# Patient Record
Sex: FEMALE | Race: BLACK OR AFRICAN AMERICAN | NOT HISPANIC OR LATINO | Employment: PART TIME | URBAN - NONMETROPOLITAN AREA
[De-identification: names, ages, dates, MRNs, and addresses within clinical notes are randomized per-mention and may not be internally consistent; named-entity substitution may affect disease eponyms.]

---

## 2018-09-17 ENCOUNTER — OFFICE VISIT (OUTPATIENT)
Dept: SURGERY | Facility: CLINIC | Age: 43
End: 2018-09-17

## 2018-09-17 VITALS
DIASTOLIC BLOOD PRESSURE: 84 MMHG | WEIGHT: 264 LBS | HEART RATE: 85 BPM | BODY MASS INDEX: 40.01 KG/M2 | SYSTOLIC BLOOD PRESSURE: 104 MMHG | HEIGHT: 68 IN | TEMPERATURE: 99.4 F

## 2018-09-17 DIAGNOSIS — N61.1 BREAST ABSCESS: Primary | ICD-10-CM

## 2018-09-17 PROCEDURE — 99203 OFFICE O/P NEW LOW 30 MIN: CPT | Performed by: SURGERY

## 2018-09-17 RX ORDER — SODIUM CHLORIDE 9 MG/ML
100 INJECTION, SOLUTION INTRAVENOUS CONTINUOUS
Status: CANCELLED | OUTPATIENT
Start: 2018-09-19

## 2018-09-17 RX ORDER — CEFAZOLIN SODIUM IN 0.9 % NACL 3 G/100 ML
3 INTRAVENOUS SOLUTION, PIGGYBACK (ML) INTRAVENOUS ONCE
Status: CANCELLED | OUTPATIENT
Start: 2018-09-19 | End: 2018-09-19

## 2018-09-18 ENCOUNTER — APPOINTMENT (OUTPATIENT)
Dept: PREADMISSION TESTING | Facility: HOSPITAL | Age: 43
End: 2018-09-18

## 2018-09-18 VITALS
HEIGHT: 68 IN | WEIGHT: 264 LBS | DIASTOLIC BLOOD PRESSURE: 76 MMHG | HEART RATE: 73 BPM | RESPIRATION RATE: 14 BRPM | SYSTOLIC BLOOD PRESSURE: 118 MMHG | OXYGEN SATURATION: 96 % | BODY MASS INDEX: 40.01 KG/M2

## 2018-09-18 NOTE — PROGRESS NOTES
"CHIEF COMPLAINT:    Abnormal breast imaging of the right breast, left flank abscess    HISTORY OF PRESENT ILLNESS:    Elijah Crouch is a 43 y.o. female who has had an evolving \"mass\" of the right breast for approximate 6 months.  She's previously had an abnormal ultrasound showing what appeared to be a dilated duct.  Recently she is experienced firmness of the skin surrounding her nipple with small amounts of intermittent drainage.  She states that there has been pain and erythema at this site as well.  Recent imaging of her breast showed skin thickening and biopsy was suggested.    She has family history of breast cancer in maternal aunt, she has never personally had abnormal breast imaging or other breast biopsy.    Menarche was at age 15, she has not undergone menopause, she is  she has taken oral contraceptives previously.    No past medical history on file.    Past Surgical History:   Procedure Laterality Date   • ESSURE TUBAL LIGATION     • HYSTEROSCOPY ENDOMETRIAL ABLATION         Prior to Admission medications    Not on File       No Known Allergies    Family History   Problem Relation Age of Onset   • Breast cancer Mother        Social History     Social History   • Marital status: Single     Spouse name: N/A   • Number of children: N/A   • Years of education: N/A     Occupational History   • Not on file.     Social History Main Topics   • Smoking status: Current Every Day Smoker     Packs/day: 0.50     Years: 7.00   • Smokeless tobacco: Never Used   • Alcohol use No   • Drug use: No   • Sexual activity: Defer     Other Topics Concern   • Not on file     Social History Narrative   • No narrative on file       Review of Systems   Constitutional: Negative for appetite change, chills, fever and unexpected weight change.   HENT: Negative for hearing loss, nosebleeds and trouble swallowing.    Eyes: Negative for visual disturbance.   Respiratory: Negative for apnea, cough, choking, chest " "tightness, shortness of breath, wheezing and stridor.    Cardiovascular: Negative for chest pain, palpitations and leg swelling.   Gastrointestinal: Negative for abdominal distention, abdominal pain, blood in stool, constipation, diarrhea, nausea and vomiting.   Endocrine: Negative for cold intolerance, heat intolerance, polydipsia, polyphagia and polyuria.   Genitourinary: Negative for difficulty urinating, dysuria, frequency, hematuria and urgency.   Musculoskeletal: Negative for arthralgias, back pain, myalgias and neck pain.   Skin: Negative for color change, pallor and rash.   Allergic/Immunologic: Negative for immunocompromised state.   Neurological: Negative for dizziness, seizures, syncope, light-headedness, numbness and headaches.   Hematological: Negative for adenopathy.   Psychiatric/Behavioral: Negative for suicidal ideas. The patient is not nervous/anxious.        Objective     /84   Pulse 85   Temp 99.4 °F (37.4 °C) (Oral)   Ht 172.7 cm (68\")   Wt 120 kg (264 lb)   LMP 08/28/2018   BMI 40.14 kg/m²     Physical Exam   Constitutional: She is oriented to person, place, and time. She appears well-developed and well-nourished. No distress.   HENT:   Head: Normocephalic and atraumatic.   Eyes: Pupils are equal, round, and reactive to light. Conjunctivae and EOM are normal. Right eye exhibits no discharge. Left eye exhibits no discharge.   Neck: Normal range of motion. Neck supple. No JVD present. No tracheal deviation present. No thyromegaly present.   Cardiovascular: Normal rate, regular rhythm and normal heart sounds.  Exam reveals no gallop and no friction rub.    No murmur heard.  Pulmonary/Chest: Effort normal and breath sounds normal. No respiratory distress. She has no wheezes. She has no rales. She exhibits no tenderness.       Abdominal: Soft. She exhibits no distension and no mass. There is no tenderness. There is no rebound and no guarding. No hernia.   Musculoskeletal: Normal range of " motion. She exhibits no edema, tenderness or deformity.   Neurological: She is alert and oriented to person, place, and time. No cranial nerve deficit.   Skin: Skin is warm and dry. No rash noted. She is not diaphoretic. No erythema. No pallor.   Psychiatric: She has a normal mood and affect. Her behavior is normal. Judgment and thought content normal.       DIAGNOSTIC DATA:    Mammogram and ultrasound right breast reviewed showing skin thickening adjacent to the nipple in the area of palpable concern    ASSESSMENT:    Right breast chronic abscess with overlying skin changes, left flank abscess    PLAN:    On examination she appears to have an abscess versus a chronically obstructed duct.  This area needs to be incised and potentially biopsied in the operating room.  Additionally we'll drain her left flank abscess concurrent with that.  Risks and benefits of incision and drainage right breast abscess and left flank abscess have been discussed with the patient and she is agreeable to proceeding.          This document has been electronically signed by Seamus Colón MD on September 18, 2018 4:41 PM

## 2018-09-19 ENCOUNTER — HOSPITAL ENCOUNTER (OUTPATIENT)
Facility: HOSPITAL | Age: 43
Setting detail: HOSPITAL OUTPATIENT SURGERY
Discharge: HOME | End: 2018-09-19
Attending: SURGERY | Admitting: SURGERY

## 2018-09-19 ENCOUNTER — ANESTHESIA (OUTPATIENT)
Dept: PERIOP | Facility: HOSPITAL | Age: 43
End: 2018-09-19

## 2018-09-19 ENCOUNTER — ANESTHESIA EVENT (OUTPATIENT)
Dept: PERIOP | Facility: HOSPITAL | Age: 43
End: 2018-09-19

## 2018-09-19 VITALS
WEIGHT: 262.79 LBS | HEART RATE: 58 BPM | OXYGEN SATURATION: 100 % | TEMPERATURE: 96.9 F | DIASTOLIC BLOOD PRESSURE: 75 MMHG | HEIGHT: 69 IN | RESPIRATION RATE: 18 BRPM | BODY MASS INDEX: 38.92 KG/M2 | SYSTOLIC BLOOD PRESSURE: 157 MMHG

## 2018-09-19 DIAGNOSIS — N61.1 BREAST ABSCESS: ICD-10-CM

## 2018-09-19 PROCEDURE — 25010000002 ONDANSETRON PER 1 MG: Performed by: NURSE ANESTHETIST, CERTIFIED REGISTERED

## 2018-09-19 PROCEDURE — 88305 TISSUE EXAM BY PATHOLOGIST: CPT | Performed by: PATHOLOGY

## 2018-09-19 PROCEDURE — 88312 SPECIAL STAINS GROUP 1: CPT | Performed by: SURGERY

## 2018-09-19 PROCEDURE — 25010000002 MIDAZOLAM PER 1 MG: Performed by: NURSE ANESTHETIST, CERTIFIED REGISTERED

## 2018-09-19 PROCEDURE — 88312 SPECIAL STAINS GROUP 1: CPT | Performed by: PATHOLOGY

## 2018-09-19 PROCEDURE — 25010000002 DEXAMETHASONE PER 1 MG: Performed by: NURSE ANESTHETIST, CERTIFIED REGISTERED

## 2018-09-19 PROCEDURE — 10061 I&D ABSCESS COMP/MULTIPLE: CPT | Performed by: SURGERY

## 2018-09-19 PROCEDURE — 25010000002 FENTANYL CITRATE (PF) 100 MCG/2ML SOLUTION: Performed by: NURSE ANESTHETIST, CERTIFIED REGISTERED

## 2018-09-19 PROCEDURE — 25010000003 CEFAZOLIN PER 500 MG: Performed by: NURSE ANESTHETIST, CERTIFIED REGISTERED

## 2018-09-19 PROCEDURE — 25010000002 PROPOFOL 10 MG/ML EMULSION: Performed by: NURSE ANESTHETIST, CERTIFIED REGISTERED

## 2018-09-19 PROCEDURE — 19020 MASTOTOMY EXPL DRG ABSC DP: CPT | Performed by: SURGERY

## 2018-09-19 PROCEDURE — 88305 TISSUE EXAM BY PATHOLOGIST: CPT | Performed by: SURGERY

## 2018-09-19 RX ORDER — ACETAMINOPHEN 650 MG/1
650 SUPPOSITORY RECTAL ONCE AS NEEDED
Status: COMPLETED | OUTPATIENT
Start: 2018-09-19 | End: 2018-09-19

## 2018-09-19 RX ORDER — HYDROCODONE BITARTRATE AND ACETAMINOPHEN 5; 325 MG/1; MG/1
1-2 TABLET ORAL EVERY 4 HOURS PRN
Qty: 20 TABLET | Refills: 0 | Status: SHIPPED | OUTPATIENT
Start: 2018-09-19 | End: 2018-11-02

## 2018-09-19 RX ORDER — CEFAZOLIN SODIUM IN 0.9 % NACL 3 G/100 ML
3 INTRAVENOUS SOLUTION, PIGGYBACK (ML) INTRAVENOUS ONCE
Status: COMPLETED | OUTPATIENT
Start: 2018-09-19 | End: 2018-09-19

## 2018-09-19 RX ORDER — LABETALOL HYDROCHLORIDE 5 MG/ML
5 INJECTION, SOLUTION INTRAVENOUS
Status: DISCONTINUED | OUTPATIENT
Start: 2018-09-19 | End: 2018-09-19 | Stop reason: HOSPADM

## 2018-09-19 RX ORDER — DEXAMETHASONE SODIUM PHOSPHATE 4 MG/ML
INJECTION, SOLUTION INTRA-ARTICULAR; INTRALESIONAL; INTRAMUSCULAR; INTRAVENOUS; SOFT TISSUE AS NEEDED
Status: DISCONTINUED | OUTPATIENT
Start: 2018-09-19 | End: 2018-09-19 | Stop reason: SURG

## 2018-09-19 RX ORDER — EPHEDRINE SULFATE 50 MG/ML
5 INJECTION, SOLUTION INTRAVENOUS ONCE AS NEEDED
Status: DISCONTINUED | OUTPATIENT
Start: 2018-09-19 | End: 2018-09-19 | Stop reason: HOSPADM

## 2018-09-19 RX ORDER — NALOXONE HCL 0.4 MG/ML
0.2 VIAL (ML) INJECTION AS NEEDED
Status: DISCONTINUED | OUTPATIENT
Start: 2018-09-19 | End: 2018-09-19 | Stop reason: HOSPADM

## 2018-09-19 RX ORDER — OXYCODONE HYDROCHLORIDE AND ACETAMINOPHEN 5; 325 MG/1; MG/1
1 TABLET ORAL EVERY 6 HOURS PRN
Qty: 20 TABLET | Refills: 0 | Status: SHIPPED | OUTPATIENT
Start: 2018-09-19 | End: 2018-11-02

## 2018-09-19 RX ORDER — ACETAMINOPHEN 325 MG/1
650 TABLET ORAL ONCE AS NEEDED
Status: COMPLETED | OUTPATIENT
Start: 2018-09-19 | End: 2018-09-19

## 2018-09-19 RX ORDER — ONDANSETRON 2 MG/ML
4 INJECTION INTRAMUSCULAR; INTRAVENOUS ONCE AS NEEDED
Status: DISCONTINUED | OUTPATIENT
Start: 2018-09-19 | End: 2018-09-19 | Stop reason: HOSPADM

## 2018-09-19 RX ORDER — PROPOFOL 10 MG/ML
VIAL (ML) INTRAVENOUS AS NEEDED
Status: DISCONTINUED | OUTPATIENT
Start: 2018-09-19 | End: 2018-09-19 | Stop reason: SURG

## 2018-09-19 RX ORDER — FLUMAZENIL 0.1 MG/ML
0.2 INJECTION INTRAVENOUS AS NEEDED
Status: DISCONTINUED | OUTPATIENT
Start: 2018-09-19 | End: 2018-09-19 | Stop reason: HOSPADM

## 2018-09-19 RX ORDER — FENTANYL CITRATE 50 UG/ML
INJECTION, SOLUTION INTRAMUSCULAR; INTRAVENOUS AS NEEDED
Status: DISCONTINUED | OUTPATIENT
Start: 2018-09-19 | End: 2018-09-19 | Stop reason: SURG

## 2018-09-19 RX ORDER — MEPERIDINE HYDROCHLORIDE 50 MG/ML
12.5 INJECTION INTRAMUSCULAR; INTRAVENOUS; SUBCUTANEOUS
Status: DISCONTINUED | OUTPATIENT
Start: 2018-09-19 | End: 2018-09-19 | Stop reason: HOSPADM

## 2018-09-19 RX ORDER — SODIUM CHLORIDE 9 MG/ML
100 INJECTION, SOLUTION INTRAVENOUS CONTINUOUS
Status: DISCONTINUED | OUTPATIENT
Start: 2018-09-19 | End: 2018-09-19 | Stop reason: HOSPADM

## 2018-09-19 RX ORDER — ONDANSETRON 2 MG/ML
INJECTION INTRAMUSCULAR; INTRAVENOUS AS NEEDED
Status: DISCONTINUED | OUTPATIENT
Start: 2018-09-19 | End: 2018-09-19 | Stop reason: SURG

## 2018-09-19 RX ORDER — CEFAZOLIN SODIUM 1 G/3ML
INJECTION, POWDER, FOR SOLUTION INTRAMUSCULAR; INTRAVENOUS AS NEEDED
Status: DISCONTINUED | OUTPATIENT
Start: 2018-09-19 | End: 2018-09-19 | Stop reason: SURG

## 2018-09-19 RX ORDER — LIDOCAINE HYDROCHLORIDE 20 MG/ML
INJECTION, SOLUTION INFILTRATION; PERINEURAL AS NEEDED
Status: DISCONTINUED | OUTPATIENT
Start: 2018-09-19 | End: 2018-09-19 | Stop reason: SURG

## 2018-09-19 RX ORDER — MIDAZOLAM HYDROCHLORIDE 1 MG/ML
INJECTION INTRAMUSCULAR; INTRAVENOUS AS NEEDED
Status: DISCONTINUED | OUTPATIENT
Start: 2018-09-19 | End: 2018-09-19 | Stop reason: SURG

## 2018-09-19 RX ORDER — DIPHENHYDRAMINE HYDROCHLORIDE 50 MG/ML
12.5 INJECTION INTRAMUSCULAR; INTRAVENOUS
Status: DISCONTINUED | OUTPATIENT
Start: 2018-09-19 | End: 2018-09-19 | Stop reason: HOSPADM

## 2018-09-19 RX ADMIN — DEXAMETHASONE SODIUM PHOSPHATE 4 MG: 4 INJECTION, SOLUTION INTRAMUSCULAR; INTRAVENOUS at 13:50

## 2018-09-19 RX ADMIN — CEFAZOLIN SODIUM 3 G: 1 INJECTION, POWDER, FOR SOLUTION INTRAMUSCULAR; INTRAVENOUS at 13:45

## 2018-09-19 RX ADMIN — CEFAZOLIN 3 G: 1 INJECTION, POWDER, FOR SOLUTION INTRAMUSCULAR; INTRAVENOUS; PARENTERAL at 13:57

## 2018-09-19 RX ADMIN — ONDANSETRON 4 MG: 2 INJECTION, SOLUTION INTRAMUSCULAR; INTRAVENOUS at 13:50

## 2018-09-19 RX ADMIN — SODIUM CHLORIDE: 900 INJECTION, SOLUTION INTRAVENOUS at 13:33

## 2018-09-19 RX ADMIN — MIDAZOLAM HYDROCHLORIDE 2 MG: 2 INJECTION, SOLUTION INTRAMUSCULAR; INTRAVENOUS at 13:33

## 2018-09-19 RX ADMIN — LIDOCAINE HYDROCHLORIDE 50 MG: 20 INJECTION, SOLUTION INFILTRATION; PERINEURAL at 13:45

## 2018-09-19 RX ADMIN — SODIUM CHLORIDE 100 ML/HR: 900 INJECTION, SOLUTION INTRAVENOUS at 09:48

## 2018-09-19 RX ADMIN — PROPOFOL 300 MG: 10 INJECTION, EMULSION INTRAVENOUS at 13:45

## 2018-09-19 RX ADMIN — ACETAMINOPHEN 650 MG: 325 TABLET ORAL at 15:35

## 2018-09-19 RX ADMIN — FENTANYL CITRATE 100 MCG: 50 INJECTION, SOLUTION INTRAMUSCULAR; INTRAVENOUS at 13:45

## 2018-09-19 NOTE — BRIEF OP NOTE
INCISION AND DRAINAGE ABSCESS  Progress Note    Elijah Andres Miko  9/19/2018    Pre-op Diagnosis:   Breast abscess [N61.1]       Post-Op Diagnosis Codes:     * Breast abscess [N61.1]    Procedure/CPT® Codes:      Procedure(s):  INCISION AND DRAINAGE OF RIGHT BREAST AND LEFT FLANK  ABSCESSES    Surgeon(s):  Seamus Colón MD    Anesthesia: General    Staff:   Circulator: Anjana Gandara RN  Scrub Person: Sadaf Gotti; Jonnie Gates  Assistant: Bertha Gipson CSA    Estimated Blood Loss: minimal    Urine Voided: * No values recorded between 9/19/2018  1:33 PM and 9/19/2018  2:10 PM *    Specimens:                ID Type Source Tests Collected by Time   A :  Tissue Breast, Right TISSUE PATHOLOGY EXAM Seamus Colón MD 9/19/2018 1402         Drains:      Findings: abscess of right breast and left flank    Complications: none          This document has been electronically signed by Seamus Colón MD on September 19, 2018 2:22 PM          Seamus Colón MD     Date: 9/19/2018  Time: 2:22 PM

## 2018-09-19 NOTE — INTERVAL H&P NOTE
H&P reviewed. The patient was examined and there are no changes to the H&P.           This document has been electronically signed by Seamus Colón MD on September 19, 2018 10:04 AM

## 2018-09-19 NOTE — ANESTHESIA PREPROCEDURE EVALUATION
Anesthesia Evaluation     no history of anesthetic complications:  NPO Solid Status: > 8 hours  NPO Liquid Status: > 8 hours           Airway   Mallampati: II  TM distance: >3 FB  Neck ROM: full  No difficulty expected  Dental    (+) poor dentition        Pulmonary - normal exam    breath sounds clear to auscultation  (+) a smoker (0.5 ppd) Current Abstained day of surgery,   Cardiovascular - negative cardio ROS and normal exam  Exercise tolerance: good (4-7 METS)    Rhythm: regular  Rate: normal    (-) angina      Neuro/Psych  (+) headaches (migraines and takes ibuprofen),     GI/Hepatic/Renal/Endo    (+) obesity,       ROS Comment: Breast abscess    Musculoskeletal (-) negative ROS    Abdominal   (+) obese,    Substance History - negative use     OB/GYN negative ob/gyn ROS   (-)  Pregnant        Other - negative ROS                       Anesthesia Plan    ASA 3     general     intravenous induction   Anesthetic plan, all risks, benefits, and alternatives have been provided, discussed and informed consent has been obtained with: patient.

## 2018-09-19 NOTE — ANESTHESIA POSTPROCEDURE EVALUATION
Patient: Elijah Crouch    Procedure Summary     Date:  09/19/18 Room / Location:  [facility] OR 03 / [facility] OR    Anesthesia Start:  1333 Anesthesia Stop:  1415    Procedure:  INCISION AND DRAINAGE OF RIGHT BREAST AND LEFT FLANK  ABSCESSES (N/A ) Diagnosis:       Breast abscess      (Breast abscess [N61.1])    Surgeon:  Seamus Colón MD Provider:  Wyatt Gould MD    Anesthesia Type:  general ASA Status:  3          Anesthesia Type: general  Last vitals  BP   111/57 (09/19/18 1414)   Temp   97.1 °F (36.2 °C) (09/19/18 1414)   Pulse   69 (09/19/18 1414)   Resp   16 (09/19/18 1414)     SpO2   93 % (09/19/18 1414)     Post Anesthesia Care and Evaluation    Patient location during evaluation: bedside  Patient participation: complete - patient cannot participate  Level of consciousness: awake  Pain score: 0  Pain management: adequate  Airway patency: patent  Anesthetic complications: No anesthetic complications  PONV Status: none  Cardiovascular status: acceptable  Respiratory status: acceptable  Hydration status: acceptable

## 2018-09-19 NOTE — ANESTHESIA PROCEDURE NOTES
Airway  Airway not difficult    General Information and Staff    Patient location during procedure: OR  CRNA: ANITA MIGUEL    Indications and Patient Condition  Indications for airway management: airway protection    Preoxygenated: yes  Mask difficulty assessment: 0 - not attempted    Final Airway Details  Final airway type: supraglottic airway      Successful airway: I-gel  Size 4    Number of attempts at approach: 1    Additional Comments  Teeth checked after intubation.  No damage noted.

## 2018-09-21 LAB
LAB AP CASE REPORT: NORMAL
LAB AP SPECIAL STAINS: NORMAL
PATH REPORT.FINAL DX SPEC: NORMAL
PATH REPORT.GROSS SPEC: NORMAL

## 2018-09-24 NOTE — OP NOTE
Operative Note    Elijah Crouch  9/19/2018    Pre-op Diagnosis:   Breast abscess [N61.1]    Post-op Diagnosis:     Post-Op Diagnosis Codes:     * Breast abscess [N61.1]    Procedure/CPT® Codes:      Procedure(s):  INCISION AND DRAINAGE OF RIGHT BREAST AND LEFT FLANK  ABSCESSES    Surgeon(s):  Seamus Colón MD    Anesthesia: General    Staff:   Circulator: Anjana Gandara RN  Scrub Person: Sadaf Gotti; Jonnie Gates  Assistant: Bertha Gipson CSA    Estimated Blood Loss: 5 mL    Specimens:                ID Type Source Tests Collected by Time   A :  Tissue Breast, Right TISSUE PATHOLOGY EXAM Seamus Colón MD 9/19/2018 1402         Drains:      Findings: Right breast and left flank abscesses    Complications: None    Indication: Right breast abscess with abnormal imaging, left flank abscess    Operative Note:    The patient was seen, marked, and consented preoperatively.  Following this she is brought to the operating room placed in supine position on the OR table.  Anesthetic was administered and airway established by anesthesia.  Preoperative briefing was performed.  The right breast and left flank abscesses were prepped and draped in normal sterile fashion.  Timeout was then performed.    Following timeout the left flank abscess was addressed.  Patient has small opening the skin.  This was incised with an empty abscess cavity was chronic granulation tissue being found below this site.  The granulation tissue was gently scraped away using a moist piece of gauze.  The cavity was then irrigated.  Hemostasis was obtained.  Packing was placed and the site was covered.    Attention was then turned to the right breast.  The patient had an opening near the 2 o'clock position of the nipple.  This area was probed and a small amount of purulent debris was removed.  It appeared to track toward the 12 o'clock position of the areola.  This area was incised opening what appeared to be a  chronic abscess cavity.  A portion of inflamed underlying breast tissue was removed and sent for permanent specimen.  There did not appear to be enough peroneal and material to be sent for culture.  Chronically inflamed tissue within the abscess was then debrided away sharply.  The wound was irrigated.  Moist packing and a sterile dressing were then placed over this site as well.  The patient was then awakened and returned to the recovery room in good condition.          This document has been electronically signed by Seamus Colón MD on September 24, 2018 5:04 PM        Seamus Colón MD     Date: 9/24/2018  Time: 5:02 PM

## 2018-09-27 ENCOUNTER — OFFICE VISIT (OUTPATIENT)
Dept: SURGERY | Facility: CLINIC | Age: 43
End: 2018-09-27

## 2018-09-27 VITALS
DIASTOLIC BLOOD PRESSURE: 84 MMHG | HEART RATE: 71 BPM | HEIGHT: 69 IN | BODY MASS INDEX: 38.95 KG/M2 | SYSTOLIC BLOOD PRESSURE: 118 MMHG | WEIGHT: 263 LBS | TEMPERATURE: 97.8 F

## 2018-09-27 DIAGNOSIS — Z09 FOLLOW UP: Primary | ICD-10-CM

## 2018-09-27 PROCEDURE — 99024 POSTOP FOLLOW-UP VISIT: CPT | Performed by: SURGERY

## 2018-09-30 NOTE — PROGRESS NOTES
CHIEF COMPLAINT:    Chief Complaint   Patient presents with   • Post-op     Incision and drainage of abscesses right breast and left flank on 9/19/18.       HISTORY OF PRESENT ILLNESS:    Elijah Crouch is a 43 y.o. female who underwent incision and drainage of right breast and left flank abscesses on 9/19/2018.  A small specimen of breast tissue was also sent for permanent specimen and showed benign findings of abscess only.  This was discussed with her today.  She states that she has been undergoing local wound care at home without difficulty.    EXAM:  Vitals:    09/27/18 0933   BP: 118/84   Pulse: 71   Temp: 97.8 °F (36.6 °C)         Granulating wound on right breast and left flank    ASSESSMENT:    Status post drainage of right breast and left flank abscesses    PLAN:    Continue local wound care.  Follow-up in 2 weeks.          This document has been electronically signed by Seamus Colón MD on September 30, 2018 3:36 PM

## 2018-10-11 ENCOUNTER — OFFICE VISIT (OUTPATIENT)
Dept: SURGERY | Facility: CLINIC | Age: 43
End: 2018-10-11

## 2018-10-11 VITALS
WEIGHT: 260 LBS | DIASTOLIC BLOOD PRESSURE: 78 MMHG | HEART RATE: 66 BPM | BODY MASS INDEX: 38.51 KG/M2 | TEMPERATURE: 98.5 F | SYSTOLIC BLOOD PRESSURE: 122 MMHG | HEIGHT: 69 IN

## 2018-10-11 DIAGNOSIS — Z09 FOLLOW UP: Primary | ICD-10-CM

## 2018-10-11 PROBLEM — N61.1 BREAST ABSCESS: Status: RESOLVED | Noted: 2018-09-17 | Resolved: 2018-10-11

## 2018-10-11 PROCEDURE — 99212 OFFICE O/P EST SF 10 MIN: CPT | Performed by: SURGERY

## 2018-10-11 RX ORDER — CLINDAMYCIN HYDROCHLORIDE 150 MG/1
1 CAPSULE ORAL 4 TIMES DAILY
COMMUNITY
Start: 2018-10-04 | End: 2018-11-02

## 2018-10-11 NOTE — PROGRESS NOTES
CHIEF COMPLAINT:    Chief Complaint   Patient presents with   • Follow-up     S/P I&D abscess Rt. breast & left flank 9-19-18.       HISTORY OF PRESENT ILLNESS:    Elijah Crouch is a 43 y.o. female who underwent drainage of abscesses previously. She returns today for follow up.  Overall she feels well, she has occasional breast tenderness. No drainage or fevers.    EXAM:  Vitals:    10/11/18 0936   BP: 122/78   Pulse: 66   Temp: 98.5 °F (36.9 °C)         Scab at right breast site, granulation tissue at left flank site. No erythema or fluctuance.    ASSESSMENT:    S/p I and D's    PLAN:    Healing well. Will see PRN.          This document has been electronically signed by Seamus Colón MD on October 11, 2018 9:48 AM

## 2018-11-02 ENCOUNTER — OFFICE VISIT (OUTPATIENT)
Dept: SURGERY | Facility: CLINIC | Age: 43
End: 2018-11-02

## 2018-11-02 ENCOUNTER — APPOINTMENT (OUTPATIENT)
Dept: PREADMISSION TESTING | Facility: HOSPITAL | Age: 43
End: 2018-11-02

## 2018-11-02 VITALS
HEIGHT: 69 IN | OXYGEN SATURATION: 97 % | WEIGHT: 276 LBS | DIASTOLIC BLOOD PRESSURE: 78 MMHG | RESPIRATION RATE: 14 BRPM | BODY MASS INDEX: 40.88 KG/M2 | SYSTOLIC BLOOD PRESSURE: 120 MMHG | HEART RATE: 75 BPM

## 2018-11-02 VITALS
SYSTOLIC BLOOD PRESSURE: 116 MMHG | BODY MASS INDEX: 39.75 KG/M2 | HEIGHT: 69 IN | TEMPERATURE: 98.5 F | HEART RATE: 84 BPM | WEIGHT: 268.4 LBS | DIASTOLIC BLOOD PRESSURE: 80 MMHG

## 2018-11-02 DIAGNOSIS — N61.1 BREAST ABSCESS: Primary | ICD-10-CM

## 2018-11-02 PROCEDURE — 99024 POSTOP FOLLOW-UP VISIT: CPT | Performed by: SURGERY

## 2018-11-02 RX ORDER — BUPIVACAINE HCL/0.9 % NACL/PF 0.1 %
2 PLASTIC BAG, INJECTION (ML) EPIDURAL ONCE
Status: CANCELLED | OUTPATIENT
Start: 2018-11-05 | End: 2018-11-05

## 2018-11-02 RX ORDER — SODIUM CHLORIDE 9 MG/ML
100 INJECTION, SOLUTION INTRAVENOUS CONTINUOUS
Status: CANCELLED | OUTPATIENT
Start: 2018-11-05

## 2018-11-02 RX ORDER — CLINDAMYCIN HYDROCHLORIDE 150 MG/1
150 CAPSULE ORAL 4 TIMES DAILY
Qty: 40 CAPSULE | Refills: 0 | Status: SHIPPED | OUTPATIENT
Start: 2018-11-02 | End: 2018-11-12

## 2018-11-03 ENCOUNTER — ANESTHESIA EVENT (OUTPATIENT)
Dept: PERIOP | Facility: HOSPITAL | Age: 43
End: 2018-11-03

## 2018-11-04 NOTE — PROGRESS NOTES
CHIEF COMPLAINT:    Recurrence of right breast abscess    HISTORY OF PRESENT ILLNESS:    Elijah Crouch is a 43 y.o. female who was previously seen for a right breast abscess which was drained.  The site appeared to be healing appropriately.  She returns today with the wound closed and now continued swelling and redness in the area.  She denies any fevers or chills.    Past Medical History:   Diagnosis Date   • Wears glasses        Past Surgical History:   Procedure Laterality Date   • ESSURE TUBAL LIGATION  2010   • HYSTEROSCOPY ENDOMETRIAL ABLATION     • INCISION AND DRAINAGE ABSCESS N/A 9/19/2018    Procedure: INCISION AND DRAINAGE OF RIGHT BREAST AND LEFT FLANK  ABSCESSES;  Surgeon: Seamus Colón MD;  Location: [facility];  Service: General       Prior to Admission medications    Medication Sig Start Date End Date Taking? Authorizing Provider   clindamycin (CLEOCIN) 150 MG capsule Take 1 capsule by mouth 4 (Four) Times a Day for 10 days. 11/2/18 11/12/18  Seamus Colón MD       No Known Allergies    Family History   Problem Relation Age of Onset   • Breast cancer Mother        Social History     Social History   • Marital status: Single     Spouse name: N/A   • Number of children: N/A   • Years of education: N/A     Occupational History   • Not on file.     Social History Main Topics   • Smoking status: Current Every Day Smoker     Packs/day: 0.50     Years: 7.00   • Smokeless tobacco: Never Used   • Alcohol use No   • Drug use: No   • Sexual activity: Defer     Other Topics Concern   • Not on file     Social History Narrative   • No narrative on file       Review of Systems   Constitutional: Negative for appetite change, chills, fever and unexpected weight change.   HENT: Negative for hearing loss, nosebleeds and trouble swallowing.    Eyes: Negative for visual disturbance.   Respiratory: Negative for apnea, cough, choking, chest tightness, shortness of breath, wheezing and stridor.   "  Cardiovascular: Negative for chest pain, palpitations and leg swelling.   Gastrointestinal: Negative for abdominal distention, abdominal pain, blood in stool, constipation, diarrhea, nausea and vomiting.   Endocrine: Negative for cold intolerance, heat intolerance, polydipsia, polyphagia and polyuria.   Genitourinary: Negative for difficulty urinating, dysuria, frequency, hematuria and urgency.   Musculoskeletal: Negative for arthralgias, back pain, myalgias and neck pain.   Skin: Positive for wound. Negative for color change, pallor and rash.   Allergic/Immunologic: Negative for immunocompromised state.   Neurological: Negative for dizziness, seizures, syncope, light-headedness, numbness and headaches.   Hematological: Negative for adenopathy.   Psychiatric/Behavioral: Negative for suicidal ideas. The patient is not nervous/anxious.        Objective     /80   Pulse 84   Temp 98.5 °F (36.9 °C) (Temporal Artery )   Ht 175.3 cm (69\")   Wt 122 kg (268 lb 6.4 oz)   BMI 39.64 kg/m²     Physical Exam   Constitutional: She is oriented to person, place, and time. She appears well-developed and well-nourished. No distress.   HENT:   Head: Normocephalic and atraumatic.   Eyes: Pupils are equal, round, and reactive to light. Conjunctivae and EOM are normal. Right eye exhibits no discharge. Left eye exhibits no discharge.   Neck: Normal range of motion. Neck supple. No JVD present. No tracheal deviation present. No thyromegaly present.   Cardiovascular: Normal rate, regular rhythm and normal heart sounds.  Exam reveals no gallop and no friction rub.    No murmur heard.  Pulmonary/Chest: Effort normal and breath sounds normal. No respiratory distress. She has no wheezes. She has no rales. She exhibits no tenderness.       Abdominal: Soft. She exhibits no distension and no mass. There is no tenderness. There is no rebound and no guarding. No hernia.   Musculoskeletal: Normal range of motion. She exhibits no edema, " tenderness or deformity.   Neurological: She is alert and oriented to person, place, and time. No cranial nerve deficit.   Skin: Skin is warm and dry. No rash noted. She is not diaphoretic. No erythema. No pallor.   Psychiatric: She has a normal mood and affect. Her behavior is normal. Judgment and thought content normal.       ASSESSMENT:    Recurrent right breast abscess    PLAN:    She appears to have recurrence of her right breast abscess.  I offered to drain this abscess today.  She would like to wait until next week.  She is scheduled for incision and drainage of right breast abscess on 11/5/2018.  Risks and benefits of incision and drainage right breast abscess have been discussed with the patient.          This document has been electronically signed by Seamus Colón MD on November 4, 2018 3:09 PM

## 2018-11-05 ENCOUNTER — HOSPITAL ENCOUNTER (OUTPATIENT)
Facility: HOSPITAL | Age: 43
Setting detail: HOSPITAL OUTPATIENT SURGERY
Discharge: HOME | End: 2018-11-05
Attending: SURGERY | Admitting: SURGERY

## 2018-11-05 ENCOUNTER — ANESTHESIA (OUTPATIENT)
Dept: PERIOP | Facility: HOSPITAL | Age: 43
End: 2018-11-05

## 2018-11-05 VITALS
DIASTOLIC BLOOD PRESSURE: 97 MMHG | RESPIRATION RATE: 20 BRPM | TEMPERATURE: 98.9 F | SYSTOLIC BLOOD PRESSURE: 133 MMHG | WEIGHT: 263.89 LBS | OXYGEN SATURATION: 100 % | HEIGHT: 69 IN | BODY MASS INDEX: 39.09 KG/M2 | HEART RATE: 68 BPM

## 2018-11-05 DIAGNOSIS — N61.1 BREAST ABSCESS: ICD-10-CM

## 2018-11-05 PROCEDURE — 25010000002 DEXAMETHASONE PER 1 MG: Performed by: NURSE ANESTHETIST, CERTIFIED REGISTERED

## 2018-11-05 PROCEDURE — 25010000002 PROPOFOL 10 MG/ML EMULSION: Performed by: NURSE ANESTHETIST, CERTIFIED REGISTERED

## 2018-11-05 PROCEDURE — 25010000002 FENTANYL CITRATE (PF) 100 MCG/2ML SOLUTION: Performed by: NURSE ANESTHETIST, CERTIFIED REGISTERED

## 2018-11-05 PROCEDURE — 88305 TISSUE EXAM BY PATHOLOGIST: CPT | Performed by: SURGERY

## 2018-11-05 PROCEDURE — 88305 TISSUE EXAM BY PATHOLOGIST: CPT | Performed by: PATHOLOGY

## 2018-11-05 PROCEDURE — 19120 REMOVAL OF BREAST LESION: CPT | Performed by: SURGERY

## 2018-11-05 PROCEDURE — 25010000002 ONDANSETRON PER 1 MG: Performed by: NURSE ANESTHETIST, CERTIFIED REGISTERED

## 2018-11-05 PROCEDURE — 25010000002 MIDAZOLAM PER 1 MG: Performed by: NURSE ANESTHETIST, CERTIFIED REGISTERED

## 2018-11-05 RX ORDER — ONDANSETRON 2 MG/ML
4 INJECTION INTRAMUSCULAR; INTRAVENOUS ONCE AS NEEDED
Status: DISCONTINUED | OUTPATIENT
Start: 2018-11-05 | End: 2018-11-05 | Stop reason: HOSPADM

## 2018-11-05 RX ORDER — HYDROCODONE BITARTRATE AND ACETAMINOPHEN 5; 325 MG/1; MG/1
1-2 TABLET ORAL EVERY 4 HOURS PRN
Qty: 20 TABLET | Refills: 0 | Status: SHIPPED | OUTPATIENT
Start: 2018-11-05 | End: 2018-12-20

## 2018-11-05 RX ORDER — LIDOCAINE HYDROCHLORIDE 20 MG/ML
INJECTION, SOLUTION INFILTRATION; PERINEURAL AS NEEDED
Status: DISCONTINUED | OUTPATIENT
Start: 2018-11-05 | End: 2018-11-05 | Stop reason: SURG

## 2018-11-05 RX ORDER — MIDAZOLAM HYDROCHLORIDE 1 MG/ML
INJECTION INTRAMUSCULAR; INTRAVENOUS AS NEEDED
Status: DISCONTINUED | OUTPATIENT
Start: 2018-11-05 | End: 2018-11-05 | Stop reason: SURG

## 2018-11-05 RX ORDER — ACETAMINOPHEN 650 MG/1
650 SUPPOSITORY RECTAL ONCE AS NEEDED
Status: DISCONTINUED | OUTPATIENT
Start: 2018-11-05 | End: 2018-11-05 | Stop reason: HOSPADM

## 2018-11-05 RX ORDER — BUPIVACAINE HCL/0.9 % NACL/PF 0.1 %
2 PLASTIC BAG, INJECTION (ML) EPIDURAL ONCE
Status: COMPLETED | OUTPATIENT
Start: 2018-11-05 | End: 2018-11-05

## 2018-11-05 RX ORDER — DEXAMETHASONE SODIUM PHOSPHATE 4 MG/ML
INJECTION, SOLUTION INTRA-ARTICULAR; INTRALESIONAL; INTRAMUSCULAR; INTRAVENOUS; SOFT TISSUE AS NEEDED
Status: DISCONTINUED | OUTPATIENT
Start: 2018-11-05 | End: 2018-11-05 | Stop reason: SURG

## 2018-11-05 RX ORDER — BUPIVACAINE HYDROCHLORIDE AND EPINEPHRINE 5; 5 MG/ML; UG/ML
INJECTION, SOLUTION EPIDURAL; INTRACAUDAL; PERINEURAL AS NEEDED
Status: DISCONTINUED | OUTPATIENT
Start: 2018-11-05 | End: 2018-11-05 | Stop reason: HOSPADM

## 2018-11-05 RX ORDER — LABETALOL HYDROCHLORIDE 5 MG/ML
5 INJECTION, SOLUTION INTRAVENOUS
Status: DISCONTINUED | OUTPATIENT
Start: 2018-11-05 | End: 2018-11-05 | Stop reason: HOSPADM

## 2018-11-05 RX ORDER — DIPHENHYDRAMINE HYDROCHLORIDE 50 MG/ML
12.5 INJECTION INTRAMUSCULAR; INTRAVENOUS
Status: DISCONTINUED | OUTPATIENT
Start: 2018-11-05 | End: 2018-11-05 | Stop reason: HOSPADM

## 2018-11-05 RX ORDER — MEPERIDINE HYDROCHLORIDE 50 MG/ML
12.5 INJECTION INTRAMUSCULAR; INTRAVENOUS; SUBCUTANEOUS
Status: DISCONTINUED | OUTPATIENT
Start: 2018-11-05 | End: 2018-11-05 | Stop reason: HOSPADM

## 2018-11-05 RX ORDER — FLUMAZENIL 0.1 MG/ML
0.2 INJECTION INTRAVENOUS AS NEEDED
Status: DISCONTINUED | OUTPATIENT
Start: 2018-11-05 | End: 2018-11-05 | Stop reason: HOSPADM

## 2018-11-05 RX ORDER — FENTANYL CITRATE 50 UG/ML
INJECTION, SOLUTION INTRAMUSCULAR; INTRAVENOUS AS NEEDED
Status: DISCONTINUED | OUTPATIENT
Start: 2018-11-05 | End: 2018-11-05 | Stop reason: SURG

## 2018-11-05 RX ORDER — ONDANSETRON 2 MG/ML
INJECTION INTRAMUSCULAR; INTRAVENOUS AS NEEDED
Status: DISCONTINUED | OUTPATIENT
Start: 2018-11-05 | End: 2018-11-05 | Stop reason: SURG

## 2018-11-05 RX ORDER — ACETAMINOPHEN 325 MG/1
650 TABLET ORAL ONCE AS NEEDED
Status: DISCONTINUED | OUTPATIENT
Start: 2018-11-05 | End: 2018-11-05 | Stop reason: HOSPADM

## 2018-11-05 RX ORDER — SODIUM CHLORIDE 9 MG/ML
100 INJECTION, SOLUTION INTRAVENOUS CONTINUOUS
Status: DISCONTINUED | OUTPATIENT
Start: 2018-11-05 | End: 2018-11-05 | Stop reason: HOSPADM

## 2018-11-05 RX ORDER — NALOXONE HCL 0.4 MG/ML
0.2 VIAL (ML) INJECTION AS NEEDED
Status: DISCONTINUED | OUTPATIENT
Start: 2018-11-05 | End: 2018-11-05 | Stop reason: HOSPADM

## 2018-11-05 RX ORDER — EPHEDRINE SULFATE 50 MG/ML
5 INJECTION, SOLUTION INTRAVENOUS ONCE AS NEEDED
Status: DISCONTINUED | OUTPATIENT
Start: 2018-11-05 | End: 2018-11-05 | Stop reason: HOSPADM

## 2018-11-05 RX ORDER — PROPOFOL 10 MG/ML
VIAL (ML) INTRAVENOUS AS NEEDED
Status: DISCONTINUED | OUTPATIENT
Start: 2018-11-05 | End: 2018-11-05 | Stop reason: SURG

## 2018-11-05 RX ADMIN — MIDAZOLAM HYDROCHLORIDE 2 MG: 2 INJECTION, SOLUTION INTRAMUSCULAR; INTRAVENOUS at 15:07

## 2018-11-05 RX ADMIN — PROPOFOL 50 MG: 10 INJECTION, EMULSION INTRAVENOUS at 15:19

## 2018-11-05 RX ADMIN — PROPOFOL 150 MG: 10 INJECTION, EMULSION INTRAVENOUS at 15:15

## 2018-11-05 RX ADMIN — FENTANYL CITRATE 25 MCG: 50 INJECTION, SOLUTION INTRAMUSCULAR; INTRAVENOUS at 15:25

## 2018-11-05 RX ADMIN — FENTANYL CITRATE 25 MCG: 50 INJECTION, SOLUTION INTRAMUSCULAR; INTRAVENOUS at 15:21

## 2018-11-05 RX ADMIN — ONDANSETRON 4 MG: 2 INJECTION INTRAMUSCULAR; INTRAVENOUS at 15:35

## 2018-11-05 RX ADMIN — SODIUM CHLORIDE 100 ML/HR: 9 INJECTION, SOLUTION INTRAVENOUS at 12:57

## 2018-11-05 RX ADMIN — LIDOCAINE HYDROCHLORIDE 60 MG: 20 INJECTION, SOLUTION INFILTRATION; PERINEURAL at 15:15

## 2018-11-05 RX ADMIN — DEXAMETHASONE SODIUM PHOSPHATE 4 MG: 4 INJECTION, SOLUTION INTRAMUSCULAR; INTRAVENOUS at 15:35

## 2018-11-05 RX ADMIN — Medication 2 G: at 15:17

## 2018-11-05 NOTE — INTERVAL H&P NOTE
H&P reviewed. The patient was examined and there are no changes to the H&P.           This document has been electronically signed by Seamus Colón MD on November 5, 2018 1:27 PM

## 2018-11-05 NOTE — ANESTHESIA POSTPROCEDURE EVALUATION
Patient: Elijah Crouch    Procedure Summary     Date:  11/05/18 Room / Location:  [facility] OR 08 / [facility] OR    Anesthesia Start:  1508 Anesthesia Stop:  1542    Procedure:  INCISION AND DRAINAGE ABSCESS right breast (Right ) Diagnosis:       Breast abscess      (Breast abscess [N61.1])    Surgeon:  Seamus Colón MD Provider:  Wyatt Gould MD    Anesthesia Type:  general ASA Status:  3          Anesthesia Type: general  Last vitals  BP   122/75 (11/05/18 1248)   Temp   98.8 °F (37.1 °C) (11/05/18 1248)   Pulse   72 (11/05/18 1248)   Resp   18 (11/05/18 1248)     SpO2   98 % (11/05/18 1248)     Post Anesthesia Care and Evaluation    Patient location during evaluation: bedside  Patient participation: complete - patient participated  Level of consciousness: awake and alert  Pain score: 0  Pain management: adequate  Airway patency: patent  Anesthetic complications: No anesthetic complications  PONV Status: none  Cardiovascular status: acceptable and hemodynamically stable  Respiratory status: acceptable and spontaneous ventilation  Hydration status: acceptable

## 2018-11-05 NOTE — PERIOPERATIVE NURSING NOTE
"Pt's family present to desk and states pt says she can't take Norco because it \"makes her sick to her stomach\" requesting Percocet Rx.  Phoned , he states unable to give Percocet Rx tonight, may phone office in the am.  He advised to call Zofran mg 1 tablet PO Q4hrs prn #12 NR to the patients pharmacy of choice.  This was phoned to Willi at [facility] [place]. All of this relayed to patient and family, verbalized understanding.    "

## 2018-11-05 NOTE — BRIEF OP NOTE
INCISION AND DRAINAGE ABSCESS  Progress Note    Elijah Andres Miko  11/5/2018    Pre-op Diagnosis:   Breast abscess [N61.1]       Post-Op Diagnosis Codes:     * Breast abscess [N61.1]    Procedure/CPT® Codes:      Procedure(s):  INCISION AND DRAINAGE ABSCESS right breast    Surgeon(s):  Seamus Colón MD    Anesthesia: General    Staff:   Circulator: Pam Bro RN  Scrub Person: Shannan Smith  Assistant: Bertha Gipson CSA    Estimated Blood Loss: minimal    Urine Voided: * No values recorded between 11/5/2018  3:06 PM and 11/5/2018  3:37 PM *    Specimens:                ID Type Source Tests Collected by Time   A :  Tissue Breast, Right TISSUE PATHOLOGY EXAM Seamus Colón MD 11/5/2018 1537         Drains:      Findings: abscess cavity    Complications: none          This document has been electronically signed by Seamus Colón MD on November 5, 2018 3:41 PM          Seamus Colón MD     Date: 11/5/2018  Time: 3:40 PM

## 2018-11-05 NOTE — H&P (VIEW-ONLY)
CHIEF COMPLAINT:    Recurrence of right breast abscess    HISTORY OF PRESENT ILLNESS:    Elijah Crouch is a 43 y.o. female who was previously seen for a right breast abscess which was drained.  The site appeared to be healing appropriately.  She returns today with the wound closed and now continued swelling and redness in the area.  She denies any fevers or chills.    Past Medical History:   Diagnosis Date   • Wears glasses        Past Surgical History:   Procedure Laterality Date   • ESSURE TUBAL LIGATION  2010   • HYSTEROSCOPY ENDOMETRIAL ABLATION     • INCISION AND DRAINAGE ABSCESS N/A 9/19/2018    Procedure: INCISION AND DRAINAGE OF RIGHT BREAST AND LEFT FLANK  ABSCESSES;  Surgeon: Seamus Colón MD;  Location: [facility];  Service: General       Prior to Admission medications    Medication Sig Start Date End Date Taking? Authorizing Provider   clindamycin (CLEOCIN) 150 MG capsule Take 1 capsule by mouth 4 (Four) Times a Day for 10 days. 11/2/18 11/12/18  Seamus Colón MD       No Known Allergies    Family History   Problem Relation Age of Onset   • Breast cancer Mother        Social History     Social History   • Marital status: Single     Spouse name: N/A   • Number of children: N/A   • Years of education: N/A     Occupational History   • Not on file.     Social History Main Topics   • Smoking status: Current Every Day Smoker     Packs/day: 0.50     Years: 7.00   • Smokeless tobacco: Never Used   • Alcohol use No   • Drug use: No   • Sexual activity: Defer     Other Topics Concern   • Not on file     Social History Narrative   • No narrative on file       Review of Systems   Constitutional: Negative for appetite change, chills, fever and unexpected weight change.   HENT: Negative for hearing loss, nosebleeds and trouble swallowing.    Eyes: Negative for visual disturbance.   Respiratory: Negative for apnea, cough, choking, chest tightness, shortness of breath, wheezing and stridor.     "  Cardiovascular: Negative for chest pain, palpitations and leg swelling.   Gastrointestinal: Negative for abdominal distention, abdominal pain, blood in stool, constipation, diarrhea, nausea and vomiting.   Endocrine: Negative for cold intolerance, heat intolerance, polydipsia, polyphagia and polyuria.   Genitourinary: Negative for difficulty urinating, dysuria, frequency, hematuria and urgency.   Musculoskeletal: Negative for arthralgias, back pain, myalgias and neck pain.   Skin: Positive for wound. Negative for color change, pallor and rash.   Allergic/Immunologic: Negative for immunocompromised state.   Neurological: Negative for dizziness, seizures, syncope, light-headedness, numbness and headaches.   Hematological: Negative for adenopathy.   Psychiatric/Behavioral: Negative for suicidal ideas. The patient is not nervous/anxious.        Objective     /80   Pulse 84   Temp 98.5 °F (36.9 °C) (Temporal Artery )   Ht 175.3 cm (69\")   Wt 122 kg (268 lb 6.4 oz)   BMI 39.64 kg/m²     Physical Exam   Constitutional: She is oriented to person, place, and time. She appears well-developed and well-nourished. No distress.   HENT:   Head: Normocephalic and atraumatic.   Eyes: Pupils are equal, round, and reactive to light. Conjunctivae and EOM are normal. Right eye exhibits no discharge. Left eye exhibits no discharge.   Neck: Normal range of motion. Neck supple. No JVD present. No tracheal deviation present. No thyromegaly present.   Cardiovascular: Normal rate, regular rhythm and normal heart sounds.  Exam reveals no gallop and no friction rub.    No murmur heard.  Pulmonary/Chest: Effort normal and breath sounds normal. No respiratory distress. She has no wheezes. She has no rales. She exhibits no tenderness.       Abdominal: Soft. She exhibits no distension and no mass. There is no tenderness. There is no rebound and no guarding. No hernia.   Musculoskeletal: Normal range of motion. She exhibits no edema, " tenderness or deformity.   Neurological: She is alert and oriented to person, place, and time. No cranial nerve deficit.   Skin: Skin is warm and dry. No rash noted. She is not diaphoretic. No erythema. No pallor.   Psychiatric: She has a normal mood and affect. Her behavior is normal. Judgment and thought content normal.       ASSESSMENT:    Recurrent right breast abscess    PLAN:    She appears to have recurrence of her right breast abscess.  I offered to drain this abscess today.  She would like to wait until next week.  She is scheduled for incision and drainage of right breast abscess on 11/5/2018.  Risks and benefits of incision and drainage right breast abscess have been discussed with the patient.          This document has been electronically signed by Seamus Colón MD on November 4, 2018 3:09 PM

## 2018-11-05 NOTE — ANESTHESIA PREPROCEDURE EVALUATION
Anesthesia Evaluation     Patient summary reviewed   no history of anesthetic complications:  NPO Solid Status: > 8 hours  NPO Liquid Status: > 8 hours           Airway   Mallampati: II  TM distance: >3 FB  Neck ROM: full  No difficulty expected  Dental    (+) poor dentition        Pulmonary - normal exam    breath sounds clear to auscultation  (+) a smoker (0.5 ppd) Current Abstained day of surgery,   Cardiovascular - negative cardio ROS and normal exam  Exercise tolerance: good (4-7 METS)    Rhythm: regular  Rate: normal    (-) angina      Neuro/Psych  (+) headaches (migraines and takes ibuprofen), psychiatric history Anxiety,     GI/Hepatic/Renal/Endo    (+) obesity,  GERD well controlled,      ROS Comment: Breast abscess    Musculoskeletal (-) negative ROS    Abdominal   (+) obese,    Substance History - negative use     OB/GYN negative ob/gyn ROS   (-)  Pregnant        Other - negative ROS                         Anesthesia Plan    ASA 3     general     intravenous induction   Anesthetic plan, all risks, benefits, and alternatives have been provided, discussed and informed consent has been obtained with: patient.

## 2018-11-05 NOTE — ANESTHESIA PROCEDURE NOTES
Airway  Urgency: elective    Airway not difficult    General Information and Staff    Patient location during procedure: OR    Indications and Patient Condition  Indications for airway management: airway protection  MILS maintained throughout  Mask difficulty assessment: 0 - not attempted    Final Airway Details  Final airway type: supraglottic airway      Successful airway: I-gel  Size 4    Number of attempts at approach: 1

## 2018-11-07 LAB
LAB AP CASE REPORT: NORMAL
PATH REPORT.FINAL DX SPEC: NORMAL
PATH REPORT.GROSS SPEC: NORMAL

## 2018-11-11 NOTE — OP NOTE
Operative Note    Elijah Crouch  11/5/2018    Pre-op Diagnosis:   Breast abscess [N61.1]    Post-op Diagnosis:     Post-Op Diagnosis Codes:     * Breast abscess [N61.1]    Procedure/CPT® Codes:      Procedure(s):  INCISION AND DRAINAGE ABSCESS right breast    Surgeon(s):  Seamus Colón MD    Anesthesia: General    Staff:   Circulator: Pam Bro RN  Scrub Person: Shannan Smith  Assistant: Bertha Gipson CSA    Estimated Blood Loss: minimal    Specimens:                ID Type Source Tests Collected by Time   A :  Tissue Breast, Right TISSUE PATHOLOGY EXAM Seamus Colón MD 11/5/2018 1537         Drains:      Findings: abscess cavity    Complications: none    Indication: Recurrent right breast abscess    Operative Note:    The patient was seen, marked, and consented preoperatively.  Following this she was brought to the operating room placed in supine position on the OR table.  Gen. anesthetic was given and a laryngeal mask airway was placed and secured by anesthesia.  Following this a preoperative briefing was performed.  The right breast was prepped and draped in normal sterile fashion and a timeout was then performed.    Following timeout the open area of spontaneous drainage on the right breast was examined and probed.  Appeared to lead into an abscess cavity superior to and underlying the nipple.  Local anesthetic was injected in and around this area and the skin overlying the abscess cavity was then incised.  There did not appear to be any loculations or purulent material within the cavity.  A portion of the skin and underlying breast tissue was excised using electrocautery and sent as a permanent specimen to rule out underlying malignancy.  Chronic granulation tissue within the wound was removed using a moist sponge.  Hemostasis was obtained with electrocautery.  Wound was irrigated.  Moist packing was then placed.  Sterile dressings were used to cover this.  The patient  was then awakened and returned to recovery in good condition.          This document has been electronically signed by Seamus Colón MD on November 11, 2018 11:38 AM        Seamus Colón MD     Date: 11/11/2018  Time: 11:36 AM

## 2018-11-12 ENCOUNTER — OFFICE VISIT (OUTPATIENT)
Dept: SURGERY | Facility: CLINIC | Age: 43
End: 2018-11-12

## 2018-11-12 VITALS
WEIGHT: 267 LBS | HEART RATE: 74 BPM | SYSTOLIC BLOOD PRESSURE: 128 MMHG | TEMPERATURE: 97.8 F | DIASTOLIC BLOOD PRESSURE: 82 MMHG | HEIGHT: 69 IN | BODY MASS INDEX: 39.55 KG/M2

## 2018-11-12 DIAGNOSIS — Z09 FOLLOW UP: Primary | ICD-10-CM

## 2018-11-12 PROCEDURE — 99024 POSTOP FOLLOW-UP VISIT: CPT | Performed by: SURGERY

## 2018-11-12 RX ORDER — ONDANSETRON 4 MG/1
1 TABLET, FILM COATED ORAL AS NEEDED
COMMUNITY
Start: 2018-11-05 | End: 2018-12-20

## 2018-11-13 NOTE — PROGRESS NOTES
CHIEF COMPLAINT:    Chief Complaint   Patient presents with   • Post-op Follow-up     P.O. I&D Rt. breast abscess 11-5-18.       HISTORY OF PRESENT ILLNESS:    Elijah Crouch is a 43 y.o. female who underwent incision and drainage of a recurrent right breast abscess on 11/5/2018.  Breast tissue removed at that time showed no evidence of neoplasm.  She has been undergoing local wound care at home.  She reports no issues at this time.    EXAM:  Vitals:    11/12/18 1106   BP: 128/82   Pulse: 74   Temp: 97.8 °F (36.6 °C)         Healing incision and drainage site without evidence of erythema or purulence.  Wet-to-dry dressing replaced today.    ASSESSMENT:    Status post incision and drainage of right breast abscess    PLAN:    The patient was again instructed in appropriate wet-to-dry wound care.  I again discussed with the patient the importance of smoking cessation.  We will see her back next week to reassess site.          This document has been electronically signed by Seamus Colón MD on November 13, 2018 4:25 PM

## 2018-11-19 ENCOUNTER — OFFICE VISIT (OUTPATIENT)
Dept: SURGERY | Facility: CLINIC | Age: 43
End: 2018-11-19

## 2018-11-19 VITALS
TEMPERATURE: 98.7 F | SYSTOLIC BLOOD PRESSURE: 120 MMHG | DIASTOLIC BLOOD PRESSURE: 82 MMHG | HEIGHT: 69 IN | HEART RATE: 77 BPM | WEIGHT: 264 LBS | BODY MASS INDEX: 39.1 KG/M2

## 2018-11-19 DIAGNOSIS — Z09 FOLLOW UP: Primary | ICD-10-CM

## 2018-11-19 PROCEDURE — 99024 POSTOP FOLLOW-UP VISIT: CPT | Performed by: SURGERY

## 2018-11-19 NOTE — PROGRESS NOTES
CHIEF COMPLAINT:    Chief Complaint   Patient presents with   • Follow-up     1 week juan diego, S/P I&D right breast abscess on 11/5/18.       HISTORY OF PRESENT ILLNESS:    Elijah Crouch is a 43 y.o. female who underwent incision and drainage of a recurrent right breast abscess on 11/5/2018.  She returns today for follow-up.  She notes no issues at home.  The area is not draining.  She has been caring for with peroxide as well as soap and water.    EXAM:  Vitals:    11/19/18 0948   BP: 120/82   Pulse: 77   Temp: 98.7 °F (37.1 °C)         Granulating right nipple wound    ASSESSMENT:    Status post incision and drainage of right breast abscess    PLAN:    Overall she appears to be healing appropriately.  Again, I encouraged her to quit smoking.  Continue local wound care and follow-up in 2 weeks.          This document has been electronically signed by Seamus Colón MD on November 19, 2018 10:02 AM

## 2018-12-03 ENCOUNTER — OFFICE VISIT (OUTPATIENT)
Dept: SURGERY | Facility: CLINIC | Age: 43
End: 2018-12-03

## 2018-12-03 VITALS
WEIGHT: 267 LBS | HEART RATE: 88 BPM | TEMPERATURE: 99 F | SYSTOLIC BLOOD PRESSURE: 122 MMHG | BODY MASS INDEX: 39.55 KG/M2 | DIASTOLIC BLOOD PRESSURE: 86 MMHG | HEIGHT: 69 IN

## 2018-12-03 DIAGNOSIS — Z09 FOLLOW UP: Primary | ICD-10-CM

## 2018-12-03 PROCEDURE — 99024 POSTOP FOLLOW-UP VISIT: CPT | Performed by: SURGERY

## 2018-12-03 NOTE — PROGRESS NOTES
CHIEF COMPLAINT:    Chief Complaint   Patient presents with   • Follow-up     S/P I&D right breast abscess on 11/5/18.       HISTORY OF PRESENT ILLNESS:    Elijah Crouch is a 43 y.o. female who underwent incision and drainage of a recurrent breast abscess.  She reports no issues today.  She feels well.  She is working on reducing her smoking.    EXAM:  Vitals:    12/03/18 0925   BP: 122/86   Pulse: 88   Temp: 99 °F (37.2 °C)         Right breast incision and drainage site healed with scab.    ASSESSMENT:    S/p I and D    PLAN:    Overall doing well.  Encouraged smoking cessation.  See me as needed.          This document has been electronically signed by Seamus Colón MD on December 3, 2018 9:43 AM

## 2018-12-07 ENCOUNTER — OFFICE VISIT (OUTPATIENT)
Dept: SURGERY | Facility: CLINIC | Age: 43
End: 2018-12-07

## 2018-12-07 VITALS
SYSTOLIC BLOOD PRESSURE: 122 MMHG | TEMPERATURE: 99.3 F | DIASTOLIC BLOOD PRESSURE: 76 MMHG | WEIGHT: 266 LBS | HEART RATE: 86 BPM | HEIGHT: 69 IN | BODY MASS INDEX: 39.4 KG/M2

## 2018-12-07 DIAGNOSIS — L03.111 CELLULITIS OF RIGHT AXILLA: Primary | ICD-10-CM

## 2018-12-07 PROCEDURE — 99212 OFFICE O/P EST SF 10 MIN: CPT | Performed by: SURGERY

## 2018-12-07 RX ORDER — SULFAMETHOXAZOLE AND TRIMETHOPRIM 800; 160 MG/1; MG/1
1 TABLET ORAL 2 TIMES DAILY
Qty: 20 TABLET | Refills: 0 | Status: SHIPPED | OUTPATIENT
Start: 2018-12-07 | End: 2018-12-20

## 2018-12-12 NOTE — PROGRESS NOTES
CHIEF COMPLAINT:    Chief Complaint   Patient presents with   • Follow-up     New complaint: Swelling right axilla.       HISTORY OF PRESENT ILLNESS:    Elijah Crouch is a 43 y.o. female who has undergone prior I&D's of right breast abscesses.  She re-presents today with complaints of new swelling and pain in the right axilla.  She has previously had similar issues in the left axilla.  She notes that she began having pain and swelling in her right axilla beginning yesterday morning.  She reports no drainage or fevers.    EXAM:  Vitals:    12/07/18 1339   BP: 122/76   Pulse: 86   Temp: 99.3 °F (37.4 °C)         Swelling, redness, tenderness in the right axilla no palpable fluctuance, old scars in left axilla from prior abscesses    Well-healed right breast incision and drainage site    ASSESSMENT:    Cellulitis of right axilla, possibly related to hidradenitis    PLAN:    Currently she does not appear to have a drainable abscess in the right axilla.  We will place her on antibiotics and see her back in approximately 1 week to reassess the site.          This document has been electronically signed by Seamus Colón MD on December 12, 2018 10:59 AM

## 2018-12-20 ENCOUNTER — APPOINTMENT (OUTPATIENT)
Dept: PREADMISSION TESTING | Facility: HOSPITAL | Age: 43
End: 2018-12-20

## 2018-12-20 ENCOUNTER — ANESTHESIA EVENT (OUTPATIENT)
Dept: PERIOP | Facility: HOSPITAL | Age: 43
End: 2018-12-20

## 2018-12-20 ENCOUNTER — OFFICE VISIT (OUTPATIENT)
Dept: SURGERY | Facility: CLINIC | Age: 43
End: 2018-12-20

## 2018-12-20 VITALS
WEIGHT: 265 LBS | HEIGHT: 68 IN | RESPIRATION RATE: 16 BRPM | SYSTOLIC BLOOD PRESSURE: 124 MMHG | DIASTOLIC BLOOD PRESSURE: 81 MMHG | OXYGEN SATURATION: 96 % | BODY MASS INDEX: 40.16 KG/M2 | HEART RATE: 78 BPM

## 2018-12-20 VITALS
DIASTOLIC BLOOD PRESSURE: 84 MMHG | WEIGHT: 269 LBS | SYSTOLIC BLOOD PRESSURE: 126 MMHG | BODY MASS INDEX: 39.84 KG/M2 | TEMPERATURE: 97.1 F | HEART RATE: 90 BPM | HEIGHT: 69 IN

## 2018-12-20 DIAGNOSIS — N61.1 BREAST ABSCESS: Primary | ICD-10-CM

## 2018-12-20 PROCEDURE — 99024 POSTOP FOLLOW-UP VISIT: CPT | Performed by: SURGERY

## 2018-12-20 RX ORDER — BUPIVACAINE HCL/0.9 % NACL/PF 0.1 %
2 PLASTIC BAG, INJECTION (ML) EPIDURAL ONCE
Status: CANCELLED | OUTPATIENT
Start: 2018-12-21 | End: 2018-12-20

## 2018-12-20 RX ORDER — SULFAMETHOXAZOLE AND TRIMETHOPRIM 800; 160 MG/1; MG/1
1 TABLET ORAL 2 TIMES DAILY
Qty: 20 TABLET | Refills: 0 | Status: SHIPPED | OUTPATIENT
Start: 2018-12-20 | End: 2018-12-20

## 2018-12-20 RX ORDER — SODIUM CHLORIDE 9 MG/ML
100 INJECTION, SOLUTION INTRAVENOUS CONTINUOUS
Status: CANCELLED | OUTPATIENT
Start: 2018-12-21

## 2018-12-20 NOTE — H&P (VIEW-ONLY)
CHIEF COMPLAINT:    New, recurrent redness and swelling of right breast    HISTORY OF PRESENT ILLNESS:    Elijah Crouch is a 43 y.o. female who is well known to me for history of prior recurrent right breast abscesses as well as concurrent tobacco smoking.  At the last visit we saw her for mild hidradenitis in her axilla.  She did gain about it for that.  Following that course of antibiotics she began noting pain, redness, and swelling again in her right breast.  She now notes constant pain in the area.  She states that this all began on Sunday.  She has not had any drainage.    Past Medical History:   Diagnosis Date   • Wears glasses        Past Surgical History:   Procedure Laterality Date   • ESSURE TUBAL LIGATION  2010   • HYSTEROSCOPY ENDOMETRIAL ABLATION     • INCISION AND DRAINAGE ABSCESS N/A 9/19/2018    Procedure: INCISION AND DRAINAGE OF RIGHT BREAST AND LEFT FLANK  ABSCESSES;  Surgeon: Seamus Colón MD;  Location: [facility];  Service: General   • INCISION AND DRAINAGE ABSCESS Right 11/5/2018    Procedure: INCISION AND DRAINAGE ABSCESS right breast;  Surgeon: Seamus Colón MD;  Location: [facility];  Service: General       Prior to Admission medications    Medication Sig Start Date End Date Taking? Authorizing Provider   HYDROcodone-acetaminophen (NORCO) 5-325 MG per tablet Take 1-2 tablets by mouth Every 4 (Four) Hours As Needed for Pain. 11/5/18   Seamus Colón MD   ondansetron (ZOFRAN) 4 MG tablet Take 1 tablet by mouth As Needed. 11/5/18   ProviderMariajose MD   sulfamethoxazole-trimethoprim (BACTRIM DS) 800-160 MG per tablet Take 1 tablet by mouth 2 (Two) Times a Day. 12/7/18   Seaums Colón MD   sulfamethoxazole-trimethoprim (BACTRIM DS) 800-160 MG per tablet Take 1 tablet by mouth 2 (Two) Times a Day. 12/20/18   Seamus Colón MD       No Known Allergies    Family History   Problem Relation Age of Onset   • Breast cancer Mother         "      Social History     Socioeconomic History   • Marital status: Single     Spouse name: Not on file   • Number of children: Not on file   • Years of education: Not on file   • Highest education level: Not on file   Social Needs   • Financial resource strain: Not on file   • Food insecurity - worry: Not on file   • Food insecurity - inability: Not on file   • Transportation needs - medical: Not on file   • Transportation needs - non-medical: Not on file   Occupational History   • Not on file   Tobacco Use   • Smoking status: Former Smoker     Packs/day: 0.50     Years: 7.00     Pack years: 3.50     Last attempt to quit: 2018     Years since quittin.0   • Smokeless tobacco: Never Used   Substance and Sexual Activity   • Alcohol use: No   • Drug use: No   • Sexual activity: Defer   Other Topics Concern   • Not on file   Social History Narrative   • Not on file       Review of Systems   Constitutional: Negative for chills and fever.   Respiratory: Negative for shortness of breath.    Cardiovascular: Negative for chest pain.   Gastrointestinal: Negative for abdominal pain.   Genitourinary: Negative for difficulty urinating.   Skin:        Redness and pain in right breast       Objective     /84   Pulse 90   Temp 97.1 °F (36.2 °C) (Temporal)   Ht 175.3 cm (69\")   Wt 122 kg (269 lb)   BMI 39.72 kg/m²     Physical Exam   Constitutional: She is oriented to person, place, and time. She appears well-developed and well-nourished. No distress.   HENT:   Head: Normocephalic and atraumatic.   Eyes: Conjunctivae and EOM are normal. Pupils are equal, round, and reactive to light. Right eye exhibits no discharge. Left eye exhibits no discharge.   Neck: Normal range of motion. Neck supple. No JVD present. No tracheal deviation present. No thyromegaly present.   Cardiovascular: Normal rate, regular rhythm and normal heart sounds. Exam reveals no gallop and no friction rub.   No murmur heard.  Pulmonary/Chest: " Effort normal and breath sounds normal. No respiratory distress. She has no wheezes. She has no rales. She exhibits no tenderness.       Abdominal: Soft. She exhibits no distension and no mass. There is no tenderness. There is no rebound and no guarding. No hernia.   Musculoskeletal: Normal range of motion. She exhibits no edema, tenderness or deformity.   Neurological: She is alert and oriented to person, place, and time. No cranial nerve deficit.   Skin: Skin is warm and dry. No rash noted. She is not diaphoretic. No erythema. No pallor.   Psychiatric: She has a normal mood and affect. Her behavior is normal. Judgment and thought content normal.         ASSESSMENT:    Recurrent right breast abscess    PLAN:    I again discussed with her the importance of quitting smoking and is it certainly is contributing to the recurrent nature of her breast abscess.  She states that she has not been smoking for the past 1 week.  She will attempt to continue to abstain from smoking.  At this time, she will unfortunately need drainage of her breasts once again.  The risks and benefits of incision and drainage of right breast abscess of been discussed with the patient and she is agreeable to proceeding.          This document has been electronically signed by Seamus Colón MD on December 20, 2018 2:44 PM

## 2018-12-20 NOTE — PROGRESS NOTES
CHIEF COMPLAINT:    New, recurrent redness and swelling of right breast    HISTORY OF PRESENT ILLNESS:    Elijah Crouch is a 43 y.o. female who is well known to me for history of prior recurrent right breast abscesses as well as concurrent tobacco smoking.  At the last visit we saw her for mild hidradenitis in her axilla.  She did gain about it for that.  Following that course of antibiotics she began noting pain, redness, and swelling again in her right breast.  She now notes constant pain in the area.  She states that this all began on Sunday.  She has not had any drainage.    Past Medical History:   Diagnosis Date   • Wears glasses        Past Surgical History:   Procedure Laterality Date   • ESSURE TUBAL LIGATION  2010   • HYSTEROSCOPY ENDOMETRIAL ABLATION     • INCISION AND DRAINAGE ABSCESS N/A 9/19/2018    Procedure: INCISION AND DRAINAGE OF RIGHT BREAST AND LEFT FLANK  ABSCESSES;  Surgeon: Seamus Colón MD;  Location: [facility];  Service: General   • INCISION AND DRAINAGE ABSCESS Right 11/5/2018    Procedure: INCISION AND DRAINAGE ABSCESS right breast;  Surgeon: Seamus Colón MD;  Location: [facility];  Service: General       Prior to Admission medications    Medication Sig Start Date End Date Taking? Authorizing Provider   HYDROcodone-acetaminophen (NORCO) 5-325 MG per tablet Take 1-2 tablets by mouth Every 4 (Four) Hours As Needed for Pain. 11/5/18   Seamus Colón MD   ondansetron (ZOFRAN) 4 MG tablet Take 1 tablet by mouth As Needed. 11/5/18   ProviderMariajose MD   sulfamethoxazole-trimethoprim (BACTRIM DS) 800-160 MG per tablet Take 1 tablet by mouth 2 (Two) Times a Day. 12/7/18   Seamus Colón MD   sulfamethoxazole-trimethoprim (BACTRIM DS) 800-160 MG per tablet Take 1 tablet by mouth 2 (Two) Times a Day. 12/20/18   Seamus Colón MD       No Known Allergies    Family History   Problem Relation Age of Onset   • Breast cancer Mother   "      Social History     Socioeconomic History   • Marital status: Single     Spouse name: Not on file   • Number of children: Not on file   • Years of education: Not on file   • Highest education level: Not on file   Social Needs   • Financial resource strain: Not on file   • Food insecurity - worry: Not on file   • Food insecurity - inability: Not on file   • Transportation needs - medical: Not on file   • Transportation needs - non-medical: Not on file   Occupational History   • Not on file   Tobacco Use   • Smoking status: Former Smoker     Packs/day: 0.50     Years: 7.00     Pack years: 3.50     Last attempt to quit: 2018     Years since quittin.0   • Smokeless tobacco: Never Used   Substance and Sexual Activity   • Alcohol use: No   • Drug use: No   • Sexual activity: Defer   Other Topics Concern   • Not on file   Social History Narrative   • Not on file       Review of Systems   Constitutional: Negative for chills and fever.   Respiratory: Negative for shortness of breath.    Cardiovascular: Negative for chest pain.   Gastrointestinal: Negative for abdominal pain.   Genitourinary: Negative for difficulty urinating.   Skin:        Redness and pain in right breast       Objective     /84   Pulse 90   Temp 97.1 °F (36.2 °C) (Temporal)   Ht 175.3 cm (69\")   Wt 122 kg (269 lb)   BMI 39.72 kg/m²     Physical Exam   Constitutional: She is oriented to person, place, and time. She appears well-developed and well-nourished. No distress.   HENT:   Head: Normocephalic and atraumatic.   Eyes: Conjunctivae and EOM are normal. Pupils are equal, round, and reactive to light. Right eye exhibits no discharge. Left eye exhibits no discharge.   Neck: Normal range of motion. Neck supple. No JVD present. No tracheal deviation present. No thyromegaly present.   Cardiovascular: Normal rate, regular rhythm and normal heart sounds. Exam reveals no gallop and no friction rub.   No murmur heard.  Pulmonary/Chest: " Effort normal and breath sounds normal. No respiratory distress. She has no wheezes. She has no rales. She exhibits no tenderness.       Abdominal: Soft. She exhibits no distension and no mass. There is no tenderness. There is no rebound and no guarding. No hernia.   Musculoskeletal: Normal range of motion. She exhibits no edema, tenderness or deformity.   Neurological: She is alert and oriented to person, place, and time. No cranial nerve deficit.   Skin: Skin is warm and dry. No rash noted. She is not diaphoretic. No erythema. No pallor.   Psychiatric: She has a normal mood and affect. Her behavior is normal. Judgment and thought content normal.         ASSESSMENT:    Recurrent right breast abscess    PLAN:    I again discussed with her the importance of quitting smoking and is it certainly is contributing to the recurrent nature of her breast abscess.  She states that she has not been smoking for the past 1 week.  She will attempt to continue to abstain from smoking.  At this time, she will unfortunately need drainage of her breasts once again.  The risks and benefits of incision and drainage of right breast abscess of been discussed with the patient and she is agreeable to proceeding.          This document has been electronically signed by Seamus Colón MD on December 20, 2018 2:44 PM

## 2018-12-21 ENCOUNTER — HOSPITAL ENCOUNTER (OUTPATIENT)
Facility: HOSPITAL | Age: 43
Setting detail: HOSPITAL OUTPATIENT SURGERY
Discharge: HOME | End: 2018-12-21
Attending: SURGERY | Admitting: SURGERY

## 2018-12-21 ENCOUNTER — ANESTHESIA (OUTPATIENT)
Dept: PERIOP | Facility: HOSPITAL | Age: 43
End: 2018-12-21

## 2018-12-21 VITALS
DIASTOLIC BLOOD PRESSURE: 89 MMHG | RESPIRATION RATE: 18 BRPM | HEART RATE: 59 BPM | TEMPERATURE: 97.2 F | BODY MASS INDEX: 40.26 KG/M2 | OXYGEN SATURATION: 97 % | WEIGHT: 265.65 LBS | SYSTOLIC BLOOD PRESSURE: 138 MMHG | HEIGHT: 68 IN

## 2018-12-21 DIAGNOSIS — N61.1 BREAST ABSCESS: ICD-10-CM

## 2018-12-21 PROCEDURE — 87186 SC STD MICRODIL/AGAR DIL: CPT | Performed by: SURGERY

## 2018-12-21 PROCEDURE — 87070 CULTURE OTHR SPECIMN AEROBIC: CPT | Performed by: SURGERY

## 2018-12-21 PROCEDURE — 25010000002 PROPOFOL 10 MG/ML EMULSION: Performed by: ANESTHESIOLOGY

## 2018-12-21 PROCEDURE — 25010000002 DEXAMETHASONE PER 1 MG: Performed by: ANESTHESIOLOGY

## 2018-12-21 PROCEDURE — 10060 I&D ABSCESS SIMPLE/SINGLE: CPT | Performed by: SURGERY

## 2018-12-21 PROCEDURE — 25010000002 ONDANSETRON PER 1 MG: Performed by: ANESTHESIOLOGY

## 2018-12-21 PROCEDURE — 25010000002 KETOROLAC TROMETHAMINE PER 15 MG: Performed by: ANESTHESIOLOGY

## 2018-12-21 PROCEDURE — 87077 CULTURE AEROBIC IDENTIFY: CPT | Performed by: SURGERY

## 2018-12-21 PROCEDURE — 25010000002 FENTANYL CITRATE (PF) 100 MCG/2ML SOLUTION: Performed by: ANESTHESIOLOGY

## 2018-12-21 PROCEDURE — 87205 SMEAR GRAM STAIN: CPT | Performed by: SURGERY

## 2018-12-21 PROCEDURE — 87185 SC STD ENZYME DETCJ PER NZM: CPT | Performed by: SURGERY

## 2018-12-21 RX ORDER — LABETALOL HYDROCHLORIDE 5 MG/ML
5 INJECTION, SOLUTION INTRAVENOUS
Status: DISCONTINUED | OUTPATIENT
Start: 2018-12-21 | End: 2018-12-21 | Stop reason: HOSPADM

## 2018-12-21 RX ORDER — SODIUM CHLORIDE 9 MG/ML
100 INJECTION, SOLUTION INTRAVENOUS CONTINUOUS
Status: DISCONTINUED | OUTPATIENT
Start: 2018-12-21 | End: 2018-12-21 | Stop reason: HOSPADM

## 2018-12-21 RX ORDER — HYDROCODONE BITARTRATE AND ACETAMINOPHEN 7.5; 325 MG/1; MG/1
1-2 TABLET ORAL EVERY 4 HOURS PRN
Qty: 30 TABLET | Refills: 0 | Status: SHIPPED | OUTPATIENT
Start: 2018-12-21 | End: 2019-01-22

## 2018-12-21 RX ORDER — EPHEDRINE SULFATE 50 MG/ML
5 INJECTION, SOLUTION INTRAVENOUS ONCE AS NEEDED
Status: DISCONTINUED | OUTPATIENT
Start: 2018-12-21 | End: 2018-12-21 | Stop reason: HOSPADM

## 2018-12-21 RX ORDER — ACETAMINOPHEN 325 MG/1
650 TABLET ORAL ONCE AS NEEDED
Status: DISCONTINUED | OUTPATIENT
Start: 2018-12-21 | End: 2018-12-21 | Stop reason: HOSPADM

## 2018-12-21 RX ORDER — OXYCODONE HYDROCHLORIDE AND ACETAMINOPHEN 5; 325 MG/1; MG/1
1 TABLET ORAL EVERY 6 HOURS PRN
Qty: 20 TABLET | Refills: 0 | Status: SHIPPED | OUTPATIENT
Start: 2018-12-21 | End: 2019-01-22

## 2018-12-21 RX ORDER — FLUMAZENIL 0.1 MG/ML
0.2 INJECTION INTRAVENOUS AS NEEDED
Status: DISCONTINUED | OUTPATIENT
Start: 2018-12-21 | End: 2018-12-21 | Stop reason: HOSPADM

## 2018-12-21 RX ORDER — DIPHENHYDRAMINE HYDROCHLORIDE 50 MG/ML
12.5 INJECTION INTRAMUSCULAR; INTRAVENOUS
Status: DISCONTINUED | OUTPATIENT
Start: 2018-12-21 | End: 2018-12-21 | Stop reason: HOSPADM

## 2018-12-21 RX ORDER — ACETAMINOPHEN 650 MG/1
650 SUPPOSITORY RECTAL ONCE AS NEEDED
Status: DISCONTINUED | OUTPATIENT
Start: 2018-12-21 | End: 2018-12-21 | Stop reason: HOSPADM

## 2018-12-21 RX ORDER — DEXAMETHASONE SODIUM PHOSPHATE 10 MG/ML
INJECTION INTRAMUSCULAR; INTRAVENOUS AS NEEDED
Status: DISCONTINUED | OUTPATIENT
Start: 2018-12-21 | End: 2018-12-21 | Stop reason: SURG

## 2018-12-21 RX ORDER — KETOROLAC TROMETHAMINE 30 MG/ML
INJECTION, SOLUTION INTRAMUSCULAR; INTRAVENOUS AS NEEDED
Status: DISCONTINUED | OUTPATIENT
Start: 2018-12-21 | End: 2018-12-21 | Stop reason: SURG

## 2018-12-21 RX ORDER — LIDOCAINE HYDROCHLORIDE 20 MG/ML
INJECTION, SOLUTION INFILTRATION; PERINEURAL AS NEEDED
Status: DISCONTINUED | OUTPATIENT
Start: 2018-12-21 | End: 2018-12-21 | Stop reason: SURG

## 2018-12-21 RX ORDER — BUPIVACAINE HCL/0.9 % NACL/PF 0.1 %
2 PLASTIC BAG, INJECTION (ML) EPIDURAL ONCE
Status: COMPLETED | OUTPATIENT
Start: 2018-12-21 | End: 2018-12-21

## 2018-12-21 RX ORDER — ONDANSETRON 2 MG/ML
INJECTION INTRAMUSCULAR; INTRAVENOUS AS NEEDED
Status: DISCONTINUED | OUTPATIENT
Start: 2018-12-21 | End: 2018-12-21 | Stop reason: SURG

## 2018-12-21 RX ORDER — ONDANSETRON 2 MG/ML
4 INJECTION INTRAMUSCULAR; INTRAVENOUS ONCE AS NEEDED
Status: DISCONTINUED | OUTPATIENT
Start: 2018-12-21 | End: 2018-12-21 | Stop reason: HOSPADM

## 2018-12-21 RX ORDER — FENTANYL CITRATE 50 UG/ML
INJECTION, SOLUTION INTRAMUSCULAR; INTRAVENOUS AS NEEDED
Status: DISCONTINUED | OUTPATIENT
Start: 2018-12-21 | End: 2018-12-21 | Stop reason: SURG

## 2018-12-21 RX ORDER — MAGNESIUM HYDROXIDE 1200 MG/15ML
LIQUID ORAL AS NEEDED
Status: DISCONTINUED | OUTPATIENT
Start: 2018-12-21 | End: 2018-12-21 | Stop reason: HOSPADM

## 2018-12-21 RX ORDER — NALOXONE HCL 0.4 MG/ML
0.2 VIAL (ML) INJECTION AS NEEDED
Status: DISCONTINUED | OUTPATIENT
Start: 2018-12-21 | End: 2018-12-21 | Stop reason: HOSPADM

## 2018-12-21 RX ORDER — PROPOFOL 10 MG/ML
VIAL (ML) INTRAVENOUS AS NEEDED
Status: DISCONTINUED | OUTPATIENT
Start: 2018-12-21 | End: 2018-12-21 | Stop reason: SURG

## 2018-12-21 RX ADMIN — SODIUM CHLORIDE 100 ML/HR: 9 INJECTION, SOLUTION INTRAVENOUS at 10:17

## 2018-12-21 RX ADMIN — ONDANSETRON 4 MG: 2 INJECTION INTRAMUSCULAR; INTRAVENOUS at 14:16

## 2018-12-21 RX ADMIN — Medication 2 G: at 14:22

## 2018-12-21 RX ADMIN — FENTANYL CITRATE 50 MCG: 50 INJECTION, SOLUTION INTRAMUSCULAR; INTRAVENOUS at 14:19

## 2018-12-21 RX ADMIN — LIDOCAINE HYDROCHLORIDE 100 MG: 20 INJECTION, SOLUTION INFILTRATION; PERINEURAL at 14:16

## 2018-12-21 RX ADMIN — DEXAMETHASONE SODIUM PHOSPHATE 4 MG: 10 INJECTION INTRAMUSCULAR; INTRAVENOUS at 14:16

## 2018-12-21 RX ADMIN — KETOROLAC TROMETHAMINE 30 MG: 30 INJECTION, SOLUTION INTRAMUSCULAR at 14:30

## 2018-12-21 RX ADMIN — PROPOFOL 200 MG: 10 INJECTION, EMULSION INTRAVENOUS at 14:16

## 2018-12-21 NOTE — ANESTHESIA POSTPROCEDURE EVALUATION
"Patient: Elijah Crouch    Procedure Summary     Date:  12/21/18 Room / Location:  [facility] OR 03 / [facility] OR    Anesthesia Start:  1411 Anesthesia Stop:  1439    Procedure:  Incision and Drainage of Right Breast Abscess (Right ) Diagnosis:       Breast abscess      (Breast abscess [N61.1])    Surgeon:  Seamus Colón MD Provider:  Herb Chisholm MD    Anesthesia Type:  general ASA Status:  3          Anesthesia Type: general  Last vitals  BP   114/68 (12/21/18 1005)   Temp   97.7 °F (36.5 °C) (12/21/18 1005)   Pulse   70 (12/21/18 1005)   Resp   18 (12/21/18 1005)     SpO2   96 % (12/21/18 1005)     Post Anesthesia Care and Evaluation    Patient location during evaluation: bedside  Patient participation: complete - patient participated  Level of consciousness: awake  Pain management: adequate  Airway patency: patent  Anesthetic complications: No anesthetic complications    Cardiovascular status: acceptable  Respiratory status: acceptable  Hydration status: acceptable    Comments: /68 (BP Location: Left arm, Patient Position: Sitting)   Pulse 70   Temp 97.7 °F (36.5 °C) (Temporal)   Resp 18   Ht 172.7 cm (68\")   Wt 121 kg (265 lb 10.5 oz)   SpO2 96%   BMI 40.39 kg/m²         "

## 2018-12-21 NOTE — BRIEF OP NOTE
INCISION AND DRAINAGE ABSCESS  Progress Note    Elijah Andres Miko  12/21/2018    Pre-op Diagnosis:   Breast abscess [N61.1]       Post-Op Diagnosis Codes:     * Breast abscess [N61.1]    Procedure/CPT® Codes:      Procedure(s):  Incision and Drainage of Right Breast Abscess    Surgeon(s):  Seamus Colón MD    Anesthesia: General    Staff:   Circulator: Ashley Us RN  Scrub Person: Trenton Willett  Assistant: Bertha Gipson CSA    Estimated Blood Loss: minimal    Urine Voided: * No values recorded between 12/21/2018  2:10 PM and 12/21/2018  2:37 PM *    Specimens:                ID Type Source Tests Collected by Time   1 : wound culture right breast Tissue Breast, Right TISSUE / BONE CULTURE Seamus Colón MD 12/21/2018 1430         Drains:      Findings: recurrent right breast abscess    Complications: none          This document has been electronically signed by Seamus Colón MD on December 21, 2018 2:41 PM          Seamus Colón MD     Date: 12/21/2018  Time: 2:41 PM

## 2018-12-21 NOTE — ANESTHESIA PROCEDURE NOTES
ANESTHESIA INTUBATION  Airway not difficult    General Information and Staff    Anesthesiologist: Herb Chisholm MD    Indications and Patient Condition    Preoxygenated: yes      Final Airway Details  Final airway type: supraglottic airway      Successful airway: I-gel  Size 4

## 2018-12-21 NOTE — ANESTHESIA PREPROCEDURE EVALUATION
Anesthesia Evaluation     Patient summary reviewed   no history of anesthetic complications:  NPO Solid Status: > 8 hours  NPO Liquid Status: > 8 hours           Airway   Mallampati: II  TM distance: >3 FB  Neck ROM: full  No difficulty expected  Dental    (+) poor dentition        Pulmonary - normal exam    breath sounds clear to auscultation  (+) a smoker (0.5 ppd) Current Abstained day of surgery,   Cardiovascular - negative cardio ROS and normal exam  Exercise tolerance: good (4-7 METS)    NYHA Classification: II  Rhythm: regular  Rate: normal    (-) angina      Neuro/Psych  (+) headaches (migraines and takes ibuprofen), psychiatric history Anxiety,     GI/Hepatic/Renal/Endo    (+) obesity,  GERD well controlled,      ROS Comment: Breast abscess    Musculoskeletal (-) negative ROS    Abdominal   (+) obese,    Substance History - negative use     OB/GYN negative ob/gyn ROS   (-)  Pregnant        Other - negative ROS                         Anesthesia Plan    ASA 3     general     intravenous induction   Anesthetic plan, all risks, benefits, and alternatives have been provided, discussed and informed consent has been obtained with: patient.

## 2018-12-21 NOTE — INTERVAL H&P NOTE
H&P reviewed. The patient was examined and there are no changes to the H&P.           This document has been electronically signed by Seamus Colón MD on December 21, 2018 2:07 PM

## 2018-12-22 NOTE — OP NOTE
Operative Note    Elijah Crouch  12/21/2018    Pre-op Diagnosis:   Breast abscess [N61.1]    Post-op Diagnosis:     Post-Op Diagnosis Codes:     * Breast abscess [N61.1]    Procedure/CPT® Codes:      Procedure(s):  Incision and Drainage of Right Breast Abscess    Surgeon(s):  Seamus Colón MD    Anesthesia: General    Staff:   Circulator: Ashley Us RN  Scrub Person: Trenton Willett  Assistant: Bertha Gipson CSA    Estimated Blood Loss: minimal    Specimens:                ID Type Source Tests Collected by Time   1 : wound culture right breast Tissue Breast, Right TISSUE / BONE CULTURE Seamus Colón MD 12/21/2018 1430         Drains:      Findings: Recurrent right breast abscess    Complications: None    Indication: Recurrent right breast abscess    Operative Note:    The patient was seen, marked, and consented in the preoperative area.  Following this she was brought to the operating room placed in supine position on the OR table.  Gen. anesthetic was administered and an airway was placed and secured by anesthesia.  A preoperative briefing was performed.  The right breast was prepped and draped in normal sterile fashion.  A timeout was then performed.    Following timeout the site of prior I&D's was reopened sharply using a scalpel.  There was immediate return of purulent fluid.  A portion of this was collected for Gram stain and culture.  The entire subareolar abscess cavity was then evacuated.  The thinned out skin from the prior I&D sites was completely trimmed away using Bovie electrocautery.  The cavity was then examined and appeared to contain granulation tissue without any evidence of loculations.  Points of bleeding were controlled Bovie electrocautery.  The wound was copiously irrigated.  Packing and sterile dressings were then placed.  The patient was awakened and returned to the recovery room in stable condition.          This document has been electronically signed  by Seamus Colón MD on December 22, 2018 3:02 AM        Seamus Colón MD     Date: 12/22/2018  Time: 3:00 AM

## 2018-12-24 LAB
BACTERIA SPEC AEROBE CULT: ABNORMAL
GRAM STN SPEC: ABNORMAL

## 2018-12-28 ENCOUNTER — OFFICE VISIT (OUTPATIENT)
Dept: SURGERY | Facility: CLINIC | Age: 43
End: 2018-12-28

## 2018-12-28 VITALS
WEIGHT: 268 LBS | TEMPERATURE: 98.5 F | DIASTOLIC BLOOD PRESSURE: 70 MMHG | HEIGHT: 68 IN | BODY MASS INDEX: 40.62 KG/M2 | SYSTOLIC BLOOD PRESSURE: 102 MMHG | HEART RATE: 81 BPM

## 2018-12-28 DIAGNOSIS — Z09 FOLLOW UP: Primary | ICD-10-CM

## 2018-12-28 PROCEDURE — 99024 POSTOP FOLLOW-UP VISIT: CPT | Performed by: SURGERY

## 2018-12-28 RX ORDER — SULFAMETHOXAZOLE AND TRIMETHOPRIM 800; 160 MG/1; MG/1
1 TABLET ORAL 2 TIMES DAILY
COMMUNITY
Start: 2018-12-20 | End: 2019-01-31

## 2019-01-08 ENCOUNTER — OFFICE VISIT (OUTPATIENT)
Dept: SURGERY | Facility: CLINIC | Age: 44
End: 2019-01-08

## 2019-01-08 VITALS
WEIGHT: 268 LBS | HEART RATE: 77 BPM | SYSTOLIC BLOOD PRESSURE: 114 MMHG | BODY MASS INDEX: 40.62 KG/M2 | HEIGHT: 68 IN | DIASTOLIC BLOOD PRESSURE: 74 MMHG | TEMPERATURE: 97.6 F

## 2019-01-08 DIAGNOSIS — Z09 FOLLOW UP: Primary | ICD-10-CM

## 2019-01-08 PROCEDURE — 99024 POSTOP FOLLOW-UP VISIT: CPT | Performed by: SURGERY

## 2019-01-08 NOTE — PROGRESS NOTES
CHIEF COMPLAINT:    Chief Complaint   Patient presents with   • Follow-up     Incision and Drainage Right breast abscess 12-21-18.       HISTORY OF PRESENT ILLNESS:    Elijah Crouch is a 43 y.o. female who underwent repeat incision and drainage of a right breast abscess on 12/21/2018.  She returns today for additional follow-up.  She continues to use nicotine gum and is abstaining from smoking.  She has no breast related complaints today.    EXAM:  Vitals:    01/08/19 1449   BP: 114/74   Pulse: 77   Temp: 97.6 °F (36.4 °C)         Well-healed incision and drainage site without fluctuance or erythema    ASSESSMENT:    Status post incision and drainage of breast abscess    PLAN:    Encouraged her to continue to not smoke.  See me as needed.          This document has been electronically signed by Seamus Colón MD on January 8, 2019 3:10 PM

## 2019-01-10 NOTE — PROGRESS NOTES
CHIEF COMPLAINT:    Chief Complaint   Patient presents with   • Post-op     I & D right breast abscess on 12/21/18.       HISTORY OF PRESENT ILLNESS:    Elijah Crouch is a 43 y.o. female who underwent incision and drainage of a recurrent right breast abscess on 12/21/2018.  She returns today for follow-up.  She is continuing local wound care at home.  She states that she has been successful in not smoking.  She reports no fevers or chills.  No drainage from the site.    EXAM:  Vitals:    12/28/18 1421   BP: 102/70   Pulse: 81   Temp: 98.5 °F (36.9 °C)         Granulating right breast wound    ASSESSMENT:    Status post incision and drainage of recurrent right breast abscess    PLAN:    Overall she appears to be healing up properly.  I encouraged her to continue to abstain from smoking.  Follow-up in 1 week.          This document has been electronically signed by Seamus Colón MD on January 10, 2019 4:04 PM

## 2019-01-22 ENCOUNTER — OFFICE VISIT (OUTPATIENT)
Dept: SURGERY | Facility: CLINIC | Age: 44
End: 2019-01-22

## 2019-01-22 ENCOUNTER — APPOINTMENT (OUTPATIENT)
Dept: PREADMISSION TESTING | Facility: HOSPITAL | Age: 44
End: 2019-01-22

## 2019-01-22 VITALS
WEIGHT: 265 LBS | SYSTOLIC BLOOD PRESSURE: 118 MMHG | BODY MASS INDEX: 39.25 KG/M2 | DIASTOLIC BLOOD PRESSURE: 78 MMHG | RESPIRATION RATE: 16 BRPM | HEIGHT: 69 IN | OXYGEN SATURATION: 98 % | HEART RATE: 91 BPM

## 2019-01-22 VITALS
BODY MASS INDEX: 40.32 KG/M2 | WEIGHT: 266 LBS | SYSTOLIC BLOOD PRESSURE: 118 MMHG | TEMPERATURE: 98.2 F | HEART RATE: 91 BPM | DIASTOLIC BLOOD PRESSURE: 78 MMHG | HEIGHT: 68 IN

## 2019-01-22 DIAGNOSIS — N61.1 BREAST ABSCESS: Primary | ICD-10-CM

## 2019-01-22 PROCEDURE — 99024 POSTOP FOLLOW-UP VISIT: CPT | Performed by: SURGERY

## 2019-01-22 RX ORDER — SODIUM CHLORIDE 9 MG/ML
100 INJECTION, SOLUTION INTRAVENOUS CONTINUOUS
Status: CANCELLED | OUTPATIENT
Start: 2019-01-23

## 2019-01-22 RX ORDER — BUPIVACAINE HCL/0.9 % NACL/PF 0.1 %
2 PLASTIC BAG, INJECTION (ML) EPIDURAL ONCE
Status: CANCELLED | OUTPATIENT
Start: 2019-01-23 | End: 2019-01-22

## 2019-01-23 ENCOUNTER — ANESTHESIA (OUTPATIENT)
Dept: PERIOP | Facility: HOSPITAL | Age: 44
End: 2019-01-23

## 2019-01-23 ENCOUNTER — ANESTHESIA EVENT (OUTPATIENT)
Dept: PERIOP | Facility: HOSPITAL | Age: 44
End: 2019-01-23

## 2019-01-23 ENCOUNTER — HOSPITAL ENCOUNTER (OUTPATIENT)
Facility: HOSPITAL | Age: 44
Setting detail: HOSPITAL OUTPATIENT SURGERY
Discharge: HOME | End: 2019-01-23
Attending: SURGERY | Admitting: SURGERY

## 2019-01-23 VITALS
SYSTOLIC BLOOD PRESSURE: 142 MMHG | RESPIRATION RATE: 18 BRPM | DIASTOLIC BLOOD PRESSURE: 76 MMHG | HEIGHT: 69 IN | TEMPERATURE: 98.1 F | HEART RATE: 68 BPM | BODY MASS INDEX: 39.8 KG/M2 | WEIGHT: 268.74 LBS | OXYGEN SATURATION: 98 %

## 2019-01-23 DIAGNOSIS — N61.1 BREAST ABSCESS: ICD-10-CM

## 2019-01-23 PROCEDURE — 88304 TISSUE EXAM BY PATHOLOGIST: CPT | Performed by: SURGERY

## 2019-01-23 PROCEDURE — 88304 TISSUE EXAM BY PATHOLOGIST: CPT | Performed by: PATHOLOGY

## 2019-01-23 PROCEDURE — 25010000002 FENTANYL CITRATE (PF) 100 MCG/2ML SOLUTION: Performed by: NURSE ANESTHETIST, CERTIFIED REGISTERED

## 2019-01-23 PROCEDURE — 19110 NIPPLE EXPLORATION: CPT | Performed by: SURGERY

## 2019-01-23 PROCEDURE — 25010000002 ONDANSETRON PER 1 MG: Performed by: NURSE ANESTHETIST, CERTIFIED REGISTERED

## 2019-01-23 PROCEDURE — 25010000002 PROPOFOL 10 MG/ML EMULSION: Performed by: NURSE ANESTHETIST, CERTIFIED REGISTERED

## 2019-01-23 PROCEDURE — 25010000002 DEXAMETHASONE PER 1 MG: Performed by: NURSE ANESTHETIST, CERTIFIED REGISTERED

## 2019-01-23 PROCEDURE — 25010000002 MIDAZOLAM PER 1 MG: Performed by: NURSE ANESTHETIST, CERTIFIED REGISTERED

## 2019-01-23 RX ORDER — OXYCODONE HYDROCHLORIDE AND ACETAMINOPHEN 5; 325 MG/1; MG/1
1 TABLET ORAL EVERY 4 HOURS PRN
Qty: 30 TABLET | Refills: 0 | Status: SHIPPED | OUTPATIENT
Start: 2019-01-23 | End: 2021-09-23

## 2019-01-23 RX ORDER — ONDANSETRON 2 MG/ML
INJECTION INTRAMUSCULAR; INTRAVENOUS AS NEEDED
Status: DISCONTINUED | OUTPATIENT
Start: 2019-01-23 | End: 2019-01-23 | Stop reason: SURG

## 2019-01-23 RX ORDER — DEXAMETHASONE SODIUM PHOSPHATE 4 MG/ML
INJECTION, SOLUTION INTRA-ARTICULAR; INTRALESIONAL; INTRAMUSCULAR; INTRAVENOUS; SOFT TISSUE AS NEEDED
Status: DISCONTINUED | OUTPATIENT
Start: 2019-01-23 | End: 2019-01-23 | Stop reason: SURG

## 2019-01-23 RX ORDER — LABETALOL HYDROCHLORIDE 5 MG/ML
5 INJECTION, SOLUTION INTRAVENOUS
Status: DISCONTINUED | OUTPATIENT
Start: 2019-01-23 | End: 2019-01-23 | Stop reason: HOSPADM

## 2019-01-23 RX ORDER — SODIUM CHLORIDE 9 MG/ML
100 INJECTION, SOLUTION INTRAVENOUS CONTINUOUS
Status: DISCONTINUED | OUTPATIENT
Start: 2019-01-23 | End: 2019-01-23 | Stop reason: HOSPADM

## 2019-01-23 RX ORDER — ACETAMINOPHEN 650 MG/1
650 SUPPOSITORY RECTAL ONCE AS NEEDED
Status: DISCONTINUED | OUTPATIENT
Start: 2019-01-23 | End: 2019-01-23 | Stop reason: HOSPADM

## 2019-01-23 RX ORDER — ONDANSETRON 2 MG/ML
4 INJECTION INTRAMUSCULAR; INTRAVENOUS ONCE AS NEEDED
Status: DISCONTINUED | OUTPATIENT
Start: 2019-01-23 | End: 2019-01-23 | Stop reason: HOSPADM

## 2019-01-23 RX ORDER — DIPHENHYDRAMINE HYDROCHLORIDE 50 MG/ML
12.5 INJECTION INTRAMUSCULAR; INTRAVENOUS
Status: DISCONTINUED | OUTPATIENT
Start: 2019-01-23 | End: 2019-01-23 | Stop reason: HOSPADM

## 2019-01-23 RX ORDER — ACETAMINOPHEN 325 MG/1
650 TABLET ORAL ONCE AS NEEDED
Status: DISCONTINUED | OUTPATIENT
Start: 2019-01-23 | End: 2019-01-23 | Stop reason: HOSPADM

## 2019-01-23 RX ORDER — LIDOCAINE HYDROCHLORIDE 20 MG/ML
INJECTION, SOLUTION INFILTRATION; PERINEURAL AS NEEDED
Status: DISCONTINUED | OUTPATIENT
Start: 2019-01-23 | End: 2019-01-23 | Stop reason: SURG

## 2019-01-23 RX ORDER — PROPOFOL 10 MG/ML
VIAL (ML) INTRAVENOUS AS NEEDED
Status: DISCONTINUED | OUTPATIENT
Start: 2019-01-23 | End: 2019-01-23 | Stop reason: SURG

## 2019-01-23 RX ORDER — FENTANYL CITRATE 50 UG/ML
INJECTION, SOLUTION INTRAMUSCULAR; INTRAVENOUS AS NEEDED
Status: DISCONTINUED | OUTPATIENT
Start: 2019-01-23 | End: 2019-01-23 | Stop reason: SURG

## 2019-01-23 RX ORDER — EPHEDRINE SULFATE 50 MG/ML
5 INJECTION, SOLUTION INTRAVENOUS ONCE AS NEEDED
Status: DISCONTINUED | OUTPATIENT
Start: 2019-01-23 | End: 2019-01-23 | Stop reason: HOSPADM

## 2019-01-23 RX ORDER — BUPIVACAINE HYDROCHLORIDE AND EPINEPHRINE 5; 5 MG/ML; UG/ML
INJECTION, SOLUTION EPIDURAL; INTRACAUDAL; PERINEURAL AS NEEDED
Status: DISCONTINUED | OUTPATIENT
Start: 2019-01-23 | End: 2019-01-23 | Stop reason: HOSPADM

## 2019-01-23 RX ORDER — FLUMAZENIL 0.1 MG/ML
0.2 INJECTION INTRAVENOUS AS NEEDED
Status: DISCONTINUED | OUTPATIENT
Start: 2019-01-23 | End: 2019-01-23 | Stop reason: HOSPADM

## 2019-01-23 RX ORDER — MIDAZOLAM HYDROCHLORIDE 1 MG/ML
INJECTION INTRAMUSCULAR; INTRAVENOUS AS NEEDED
Status: DISCONTINUED | OUTPATIENT
Start: 2019-01-23 | End: 2019-01-23 | Stop reason: SURG

## 2019-01-23 RX ORDER — BUPIVACAINE HCL/0.9 % NACL/PF 0.1 %
2 PLASTIC BAG, INJECTION (ML) EPIDURAL ONCE
Status: COMPLETED | OUTPATIENT
Start: 2019-01-23 | End: 2019-01-23

## 2019-01-23 RX ORDER — NALOXONE HCL 0.4 MG/ML
0.2 VIAL (ML) INJECTION AS NEEDED
Status: DISCONTINUED | OUTPATIENT
Start: 2019-01-23 | End: 2019-01-23 | Stop reason: HOSPADM

## 2019-01-23 RX ADMIN — DEXAMETHASONE SODIUM PHOSPHATE 4 MG: 4 INJECTION, SOLUTION INTRAMUSCULAR; INTRAVENOUS at 08:55

## 2019-01-23 RX ADMIN — Medication 2 G: at 08:49

## 2019-01-23 RX ADMIN — LIDOCAINE HYDROCHLORIDE 50 MG: 20 INJECTION, SOLUTION INFILTRATION; PERINEURAL at 08:44

## 2019-01-23 RX ADMIN — ONDANSETRON 4 MG: 2 INJECTION INTRAMUSCULAR; INTRAVENOUS at 08:55

## 2019-01-23 RX ADMIN — MIDAZOLAM HYDROCHLORIDE 2 MG: 2 INJECTION, SOLUTION INTRAMUSCULAR; INTRAVENOUS at 08:37

## 2019-01-23 RX ADMIN — SODIUM CHLORIDE 100 ML/HR: 9 INJECTION, SOLUTION INTRAVENOUS at 07:08

## 2019-01-23 RX ADMIN — PROPOFOL 200 MG: 10 INJECTION, EMULSION INTRAVENOUS at 08:44

## 2019-01-23 RX ADMIN — FENTANYL CITRATE 100 MCG: 50 INJECTION, SOLUTION INTRAMUSCULAR; INTRAVENOUS at 08:51

## 2019-01-23 NOTE — PROGRESS NOTES
CHIEF COMPLAINT:    Drainage from right breast    HISTORY OF PRESENT ILLNESS:    Elijah Crouch is a 43 y.o. female who is known to me for multiple prior right breast abscesses.  She had healed from her most recent I and D.  She reports that she began having itching in her breast over the weekend.  This morning she had spontaneous drainage from the previous I and D site.  She also notes redness and swelling of the breast.  She has quit smoking since her previous abscess.    Past Medical History:   Diagnosis Date   • Migraines    • Wears glasses        Past Surgical History:   Procedure Laterality Date   • ESSURE TUBAL LIGATION  2010   • HYSTEROSCOPY ENDOMETRIAL ABLATION     • INCISION AND DRAINAGE ABSCESS N/A 9/19/2018    Procedure: INCISION AND DRAINAGE OF RIGHT BREAST AND LEFT FLANK  ABSCESSES;  Surgeon: Seamus Colón MD;  Location: [facility];  Service: General   • INCISION AND DRAINAGE ABSCESS Right 11/5/2018    Procedure: INCISION AND DRAINAGE ABSCESS right breast;  Surgeon: Seamus Colón MD;  Location: [facility] OR;  Service: General   • INCISION AND DRAINAGE ABSCESS Right 12/21/2018    Procedure: Incision and Drainage of Right Breast Abscess;  Surgeon: Seamus Colón MD;  Location: [facility];  Service: General       Prior to Admission medications    Medication Sig Start Date End Date Taking? Authorizing Provider   sulfamethoxazole-trimethoprim (BACTRIM DS,SEPTRA DS) 800-160 MG per tablet Take 1 tablet by mouth 2 (Two) Times a Day. 12/20/18  Yes Provider, MD Mraiajose       Allergies   Allergen Reactions   • Hydrocodone-Acetaminophen Nausea And Vomiting       Family History   Problem Relation Age of Onset   • Breast cancer Mother        Social History     Socioeconomic History   • Marital status: Single     Spouse name: Not on file   • Number of children: Not on file   • Years of education: Not on file   • Highest education level: Not on file   Social Needs   • Financial  "resource strain: Not on file   • Food insecurity - worry: Not on file   • Food insecurity - inability: Not on file   • Transportation needs - medical: Not on file   • Transportation needs - non-medical: Not on file   Occupational History   • Not on file   Tobacco Use   • Smoking status: Former Smoker     Packs/day: 0.50     Years: 7.00     Pack years: 3.50     Last attempt to quit: 2018     Years since quittin.1   • Smokeless tobacco: Never Used   Substance and Sexual Activity   • Alcohol use: No   • Drug use: No   • Sexual activity: Defer   Other Topics Concern   • Not on file   Social History Narrative   • Not on file       Review of Systems   Constitutional: Negative for activity change and appetite change.   Respiratory: Negative for cough and shortness of breath.    Cardiovascular: Negative for chest pain.   Gastrointestinal: Negative for abdominal pain.   Skin: Positive for wound.       Objective     /78   Pulse 91   Temp 98.2 °F (36.8 °C)   Ht 172.7 cm (68\")   Wt 121 kg (266 lb)   LMP 2018   BMI 40.45 kg/m²     Physical Exam   Constitutional: She is oriented to person, place, and time. She appears well-developed and well-nourished. No distress.   HENT:   Head: Normocephalic and atraumatic.   Eyes: Conjunctivae and EOM are normal. Right eye exhibits no discharge. Left eye exhibits no discharge. No scleral icterus.   Neck: Normal range of motion. Neck supple. No tracheal deviation present. No thyromegaly present.   Cardiovascular: Normal rate and regular rhythm. Exam reveals no gallop and no friction rub.   No murmur heard.  Pulmonary/Chest: Effort normal and breath sounds normal.       Abdominal: Soft. There is no tenderness.   Neurological: She is alert and oriented to person, place, and time.   Skin: She is not diaphoretic.   Psychiatric: She has a normal mood and affect. Her behavior is normal. Judgment and thought content normal.         ASSESSMENT:    Recurrent right breast " abscess    PLAN:    I discussed with her that she may have a mammary duct fistula which is causing her recurrent abscesses.  We have discussed this previously given her smoking history.  At this point I have recommended she undergo excision of breast tissue in this area, with possible opening of any fistulae.  She is agreeable with proceeding.  The risks and benefits of excision of chronically infected right breast tissue have been discussed and she is agreeable with proceeding.          This document has been electronically signed by Seamus Colón MD on January 23, 2019 7:00 AM

## 2019-01-23 NOTE — ANESTHESIA PREPROCEDURE EVALUATION
Anesthesia Evaluation     Patient summary reviewed   no history of anesthetic complications:  NPO Solid Status: > 8 hours  NPO Liquid Status: > 8 hours           Airway   Mallampati: II  TM distance: >3 FB  Neck ROM: full  No difficulty expected  Dental    (+) poor dentition        Pulmonary - normal exam    breath sounds clear to auscultation  (+) a smoker (Quit smoking December 2018.) Former, decreased breath sounds,   Cardiovascular - negative cardio ROS and normal exam  Exercise tolerance: good (4-7 METS)    NYHA Classification: II  Rhythm: regular  Rate: normal    (-) angina, murmur      Neuro/Psych  (+) headaches (migraines and takes ibuprofen), psychiatric history Anxiety,     GI/Hepatic/Renal/Endo    (+) obesity,  GERD well controlled,      ROS Comment: Breast abscess    Musculoskeletal (-) negative ROS    Abdominal   (+) obese,    Substance History - negative use     OB/GYN negative ob/gyn ROS   (-)  Pregnant        Other - negative ROS                         Anesthesia Plan    ASA 3     general     intravenous induction   Anesthetic plan, all risks, benefits, and alternatives have been provided, discussed and informed consent has been obtained with: patient, spouse/significant other, mother and child.

## 2019-01-23 NOTE — ANESTHESIA POSTPROCEDURE EVALUATION
Patient: Elijah Crouch    Procedure Summary     Date:  01/23/19 Room / Location:  [facility] OR 03 / [facility] OR    Anesthesia Start:  0837 Anesthesia Stop:  0916    Procedure:  Excision of chronically infected right breast tissue (Right Breast) Diagnosis:       Breast abscess      (Breast abscess [N61.1])    Surgeon:  Seamus Colón MD Provider:  Chuy Ham MD    Anesthesia Type:  general ASA Status:  3          Anesthesia Type: general  Last vitals  BP   114/63 (01/23/19 0656)   Temp   98.1 °F (36.7 °C) (01/23/19 0656)   Pulse   79 (01/23/19 0656)   Resp   18 (01/23/19 0656)     SpO2   96 % (01/23/19 0656)     Post Anesthesia Care and Evaluation    Patient location during evaluation: bedside  Patient participation: complete - patient cannot participate  Level of consciousness: awake  Pain score: 1  Pain management: adequate  Airway patency: patent  Anesthetic complications: No anesthetic complications  PONV Status: none  Cardiovascular status: acceptable  Respiratory status: acceptable  Hydration status: acceptable  Post Neuraxial Block status: Motor and sensory function returned to baseline

## 2019-01-24 LAB
LAB AP CASE REPORT: NORMAL
PATH REPORT.FINAL DX SPEC: NORMAL
PATH REPORT.GROSS SPEC: NORMAL

## 2019-01-31 ENCOUNTER — OFFICE VISIT (OUTPATIENT)
Dept: SURGERY | Facility: CLINIC | Age: 44
End: 2019-01-31

## 2019-01-31 VITALS
HEIGHT: 69 IN | HEART RATE: 69 BPM | WEIGHT: 268.4 LBS | DIASTOLIC BLOOD PRESSURE: 80 MMHG | SYSTOLIC BLOOD PRESSURE: 118 MMHG | BODY MASS INDEX: 39.75 KG/M2 | TEMPERATURE: 97.9 F

## 2019-01-31 DIAGNOSIS — Z09 FOLLOW UP: Primary | ICD-10-CM

## 2019-01-31 PROCEDURE — 99024 POSTOP FOLLOW-UP VISIT: CPT | Performed by: SURGERY

## 2019-01-31 NOTE — PROGRESS NOTES
CHIEF COMPLAINT:    Chief Complaint   Patient presents with   • Post-op Follow-up     P.O. Marsupialization of Mammary duct fistula 1-23-19.       HISTORY OF PRESENT ILLNESS:    Elijah Crouch is a 43 y.o. 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  She returns today for follow-up.  She is undergoing local wound care at home without issue.  She notes no complaints other than soreness in the area.  She has continued to abstain from smoking.    EXAM:  Vitals:    01/31/19 1028   BP: 118/80   Pulse: 69   Temp: 97.9 °F (36.6 °C)         Granulating wound on right breast    ASSESSMENT:    Status post excision of mammary duct fistula    PLAN:    Overall she appears to be healing appropriately.  I encouraged her to continue to abstain from smoking.  We will see her back in 2 weeks to recheck site.          This document has been electronically signed by Seamus Colón MD on January 31, 2019 10:43 AM

## 2021-08-24 DIAGNOSIS — Z01.818 PREOP EXAMINATION: Primary | ICD-10-CM

## 2021-09-23 ENCOUNTER — OFFICE VISIT (OUTPATIENT)
Dept: CARDIOLOGY | Facility: CLINIC | Age: 46
End: 2021-09-23

## 2021-09-23 ENCOUNTER — PROCEDURE VISIT (OUTPATIENT)
Dept: PULMONOLOGY | Facility: CLINIC | Age: 46
End: 2021-09-23

## 2021-09-23 ENCOUNTER — OFFICE VISIT (OUTPATIENT)
Dept: PULMONOLOGY | Facility: CLINIC | Age: 46
End: 2021-09-23

## 2021-09-23 VITALS
SYSTOLIC BLOOD PRESSURE: 124 MMHG | HEART RATE: 70 BPM | WEIGHT: 287.4 LBS | OXYGEN SATURATION: 98 % | HEIGHT: 69 IN | BODY MASS INDEX: 42.57 KG/M2 | DIASTOLIC BLOOD PRESSURE: 84 MMHG

## 2021-09-23 VITALS
BODY MASS INDEX: 42.51 KG/M2 | OXYGEN SATURATION: 98 % | HEART RATE: 70 BPM | HEIGHT: 69 IN | WEIGHT: 287 LBS | DIASTOLIC BLOOD PRESSURE: 84 MMHG | SYSTOLIC BLOOD PRESSURE: 124 MMHG

## 2021-09-23 DIAGNOSIS — Z87.891 PERSONAL HISTORY OF NICOTINE DEPENDENCE: ICD-10-CM

## 2021-09-23 DIAGNOSIS — E66.01 MORBID OBESITY WITH BMI OF 40.0-44.9, ADULT (HCC): ICD-10-CM

## 2021-09-23 DIAGNOSIS — Z01.818 PREOP EXAMINATION: ICD-10-CM

## 2021-09-23 DIAGNOSIS — Z01.818 PREOP EXAMINATION: Primary | ICD-10-CM

## 2021-09-23 DIAGNOSIS — Z01.810 PRE-OPERATIVE CARDIOVASCULAR EXAMINATION, HIGH RISK SURGERY: Primary | ICD-10-CM

## 2021-09-23 PROCEDURE — 99203 OFFICE O/P NEW LOW 30 MIN: CPT | Performed by: INTERNAL MEDICINE

## 2021-09-23 PROCEDURE — 94010 BREATHING CAPACITY TEST: CPT | Performed by: INTERNAL MEDICINE

## 2021-09-23 PROCEDURE — 99203 OFFICE O/P NEW LOW 30 MIN: CPT | Performed by: NURSE PRACTITIONER

## 2021-09-23 PROCEDURE — 93000 ELECTROCARDIOGRAM COMPLETE: CPT | Performed by: INTERNAL MEDICINE

## 2021-09-23 NOTE — PROGRESS NOTES
Pre-op Exam      History of Present Illness    Elijah Crouch is a 46-year-old  female with PMH of migraines.  Does not take medications on a daily basis.  She presents today for preoperative cardiovascular clearance for bariatric surgery with gastric sleeve.  Surgeon: Dr. Wheat in [facility].  Planned anesthesia: General.  Patient reports being overweight most of her life and struggling to lose weight on her own despite multiple modalities.  Patient denies history of high blood pressure, high cholesterol, diabetes, thyroid problems, previous cardiac problems, blood clotting or bleeding disorder, aneurysm,.  She denies family cardiac history or early death or CAD. Patient did quit smoking 5 weeks ago. Congratulated on cessation.     She denies chest pain, palpitations, shortness of breath, dizziness, syncope, leg edema, orthopnea or PND. She denies any previous complications with anesthesia.     Cardiac Risk Factors:  Obesity      Past Medical History:   Diagnosis Date   • Migraines    • Wears glasses      Past Surgical History:   Procedure Laterality Date   • BREAST LUMPECTOMY Right 1/23/2019    Procedure: Excision of chronically infected right breast tissue;  Surgeon: Seamus Colón MD;  Location: [facility];  Service: General   • ESSURE TUBAL LIGATION  2010   • HYSTEROSCOPY ENDOMETRIAL ABLATION     • INCISION AND DRAINAGE ABSCESS N/A 9/19/2018    Procedure: INCISION AND DRAINAGE OF RIGHT BREAST AND LEFT FLANK  ABSCESSES;  Surgeon: Seamus Colón MD;  Location: [facility] OR;  Service: General   • INCISION AND DRAINAGE ABSCESS Right 11/5/2018    Procedure: INCISION AND DRAINAGE ABSCESS right breast;  Surgeon: Seamus Colón MD;  Location: [facility] OR;  Service: General   • INCISION AND DRAINAGE ABSCESS Right 12/21/2018    Procedure: Incision and Drainage of Right Breast Abscess;  Surgeon: Seamus Colón MD;  Location: [facility] OR;  Service: General      Social History     Socioeconomic History   • Marital status: Single     Spouse name: Not on file   • Number of children: Not on file   • Years of education: Not on file   • Highest education level: Not on file   Tobacco Use   • Smoking status: Former Smoker     Packs/day: 0.50     Years: 7.00     Pack years: 3.50     Quit date: 2018     Years since quittin.7   • Smokeless tobacco: Never Used   Vaping Use   • Vaping Use: Never used   Substance and Sexual Activity   • Alcohol use: No   • Drug use: No   • Sexual activity: Defer     Family History   Problem Relation Age of Onset   • Breast cancer Mother        ALLERGIES:  Allergies   Allergen Reactions   • Hydrocodone-Acetaminophen Nausea And Vomiting         Review of Systems   Constitutional: Negative for chills, fever, malaise/fatigue and weight gain.   HENT: Negative for nosebleeds and tinnitus.    Eyes: Negative for blurred vision and double vision.   Cardiovascular: Negative for chest pain, dyspnea on exertion, irregular heartbeat, leg swelling, palpitations and syncope.   Respiratory: Negative for cough, shortness of breath, sleep disturbances due to breathing and snoring.    Endocrine: Negative for polydipsia, polyphagia and polyuria.   Hematologic/Lymphatic: Negative for bleeding problem. Does not bruise/bleed easily.   Skin: Negative for color change and suspicious lesions.   Musculoskeletal: Negative for falls and myalgias.   Gastrointestinal: Negative for bloating, heartburn and hematochezia.   Genitourinary: Negative for dysuria and hematuria.   Neurological: Negative for dizziness, headaches, seizures, vertigo and weakness.   Psychiatric/Behavioral: Negative for altered mental status and depression. The patient does not have insomnia and is not nervous/anxious.    Allergic/Immunologic: Negative for environmental allergies and persistent infections.       No current outpatient medications on file.     No current facility-administered  "medications for this visit.       OBJECTIVE:    Physical Exam:   Vitals reviewed.   Constitutional:       General: Not in acute distress.     Appearance: Normal appearance. Well-developed. Morbidly obese. Not ill-appearing, toxic-appearing or diaphoretic.   Eyes:      General: Lids are normal.      Conjunctiva/sclera: Conjunctivae normal.   HENT:      Head: Normocephalic and atraumatic.      Right Ear: External ear normal.      Left Ear: External ear normal.   Neck:      Vascular: No JVD.   Pulmonary:      Effort: Pulmonary effort is normal. No respiratory distress.      Breath sounds: Normal breath sounds. No decreased breath sounds. No wheezing. No rales.   Chest:      Chest wall: Not tender to palpatation.   Cardiovascular:      PMI at left midclavicular line. Normal rate. Regular rhythm. Normal S1 with normal intensity. Normal S2 with normal intensity.      Murmurs: There is no murmur.      No gallop. No S3 and S4 gallop. No click. No rub.   Pulses:     Intact distal pulses. No decreased pulses.   Edema:     Peripheral edema absent.   Abdominal:      General: Bowel sounds are normal. There is no distension.      Palpations: Abdomen is soft.      Tenderness: There is no abdominal tenderness.   Musculoskeletal:      Cervical back: Normal range of motion and neck supple. Skin:     General: Skin is warm and dry.      Coloration: Skin is not pale.      Findings: No erythema or rash.   Neurological:      Mental Status: Alert and oriented to person, place, and time.      Gait: Gait normal.   Psychiatric:         Behavior: Behavior normal.         Thought Content: Thought content normal.         Judgment: Judgment normal.       Vitals:    09/23/21 1414   BP: 124/84   BP Location: Left arm   Patient Position: Sitting   Pulse: 70   SpO2: 98%   Weight: 130 kg (287 lb 6.4 oz)   Height: 175.3 cm (69\")       DATA REVIEWED:       No radiology results for the last 30 days.  CXR:     CT:     Labs: BMP, CBC, LIPID, TSH  No " "results found for: GLUCOSE, CALCIUM, NA, K, CO2, CL, BUN, CREATININE, EGFRIFAFRI, EGFRIFNONA, BCR, ANIONGAP  Lab Results   Component Value Date    WBC 5.6 03/19/2015    HGB 12.3 03/19/2015    HCT 39.1 03/19/2015    MCV 81.1 03/19/2015     03/19/2015     No results found for: CHOL  No results found for: TRIG  No results found for: HDL  No components found for: LDLCALC  No results found for: LDL  No results found for: HDLLDLRATIO  No components found for: CHOLHDL  Lab Results   Component Value Date    TSH 1.45 12/15/2014     No results found for: PROBNP  EKG:                 The following portions of the patient's history were reviewed and updated as appropriate: allergies, current medications, past family history, past medical history, past social history, past surgical history and problem list.  Old records reviewed and pertinent information is included in the above objective data.     ASSESSMENT/PLAN:       Diagnosis Plan   1. Pre-operative cardiovascular examination, high risk surgery  Treadmill Stress Test   2. Morbid obesity with BMI of 40.0-44.9, adult (HCC)         #1. Pre-Op Evaluation Assessment  46 y.o. female with planned surgery gastric sleeve with Dr. Wheat  Known risk factors for perioperative complications: None.      Cardiac Risk Estimation: per the Revised Cardiac Risk Index (Circ. 100:1043, 1999), the patient's risk factors for cardiac complications include \"high-risk\" surgery (intraperitoneal, intrathoracic, or suprainguinal vascular), putting her in: RCI RISK CLASS II (1 risk factor, risk of major cardiac compl. appr. 1.3%).     Pre-Op Evaluation Plan  1. Preoperative workup as follows cardiac stress testing to evaluate known coronary disease, ECG. TST. R/B discussed.  2. Change in medication regimen before surgery: none, continue medication regimen including morning of surgery, with sip of water.  3. Prophylaxis for cardiac events with perioperative beta-blockers: not indicated.  4. " "Invasive hemodynamic monitoring perioperatively: At the discretion of anesthesia  5. Deep vein thrombosis prophylaxis:regimen to be chosen by surgical team.    Cardiovascular Risk Estimates provided by the [facility] are provisional and no guarantee of outcome. [facility] does not provide \"surgical clearance,\" but only provides a risk assessment and management options.  The final decision for operative intervention is at the discretion of the operating physician.      Patient's Body mass index is 42.44 kg/m². indicating that she is morbidly obese (BMI > 40 or > 35 with obesity - related health condition). Obesity-related health conditions include the following: none. Obesity is unchanged. BMI is is above average; BMI management plan is completed. We discussed portion control and increasing exercise.      Follow up: PRN, will call with results.              This document has been electronically signed by NINA Harvey on September 24, 2021 08:30 CDT   "

## 2021-09-23 NOTE — PROGRESS NOTES
Pulmonary Consultation    Doretha Howard APRN,    Thank you for asking me to see Elijah Crouch for   Chief Complaint   Patient presents with   • Surgical Clearance     gastric sleeve,  Dr. Wheat in [place], [place]   .      History of Present Illness  Elijah Crouch is a 46 y.o. female     Patient here for preop eval before bariatric surgery    Quit smoking 5 weeks ago ago. No pulmonary symptoms  No history of asthma  Not on any inhalers  No history of sleep apnea.  No history of trouble with anesthesia    Tobacco use history:  Type: cigarettes  Amount: <1 ppd  Duration: 9 years  Cessation:  5 weeks         Review of Systems: History obtained from chart review and the patient.  Review of Systems  As described in the HPI. Otherwise, remainder of ROS (14 systems) were negative.    Patient Active Problem List   Diagnosis   • Breast abscess       No current outpatient medications on file.    Allergies   Allergen Reactions   • Hydrocodone-Acetaminophen Nausea And Vomiting       Past Medical History:   Diagnosis Date   • Migraines    • Wears glasses      Past Surgical History:   Procedure Laterality Date   • BREAST LUMPECTOMY Right 1/23/2019    Procedure: Excision of chronically infected right breast tissue;  Surgeon: Seamus Colón MD;  Location: [facility];  Service: General   • ESSURE TUBAL LIGATION  2010   • HYSTEROSCOPY ENDOMETRIAL ABLATION     • INCISION AND DRAINAGE ABSCESS N/A 9/19/2018    Procedure: INCISION AND DRAINAGE OF RIGHT BREAST AND LEFT FLANK  ABSCESSES;  Surgeon: Seamus Colón MD;  Location: [facility] OR;  Service: General   • INCISION AND DRAINAGE ABSCESS Right 11/5/2018    Procedure: INCISION AND DRAINAGE ABSCESS right breast;  Surgeon: Seamus Colón MD;  Location: [facility] OR;  Service: General   • INCISION AND DRAINAGE ABSCESS Right 12/21/2018    Procedure: Incision and Drainage of Right Breast Abscess;  Surgeon: Seamus Colón MD;  Location:   "[facility] OR;  Service: General     Social History     Socioeconomic History   • Marital status: Single     Spouse name: Not on file   • Number of children: Not on file   • Years of education: Not on file   • Highest education level: Not on file   Tobacco Use   • Smoking status: Former Smoker     Packs/day: 0.50     Years: 7.00     Pack years: 3.50     Quit date: 2018     Years since quittin.7   • Smokeless tobacco: Never Used   Vaping Use   • Vaping Use: Never used   Substance and Sexual Activity   • Alcohol use: No   • Drug use: No   • Sexual activity: Defer     Family History   Problem Relation Age of Onset   • Breast cancer Mother           Objective     Blood pressure 124/84, pulse 70, height 175.3 cm (69\"), weight 130 kg (287 lb), SpO2 98 %, not currently breastfeeding.  Physical Exam  Vitals reviewed.   Constitutional:       Appearance: Normal appearance.   HENT:      Head: Normocephalic and atraumatic.      Nose: Nose normal.      Mouth/Throat:      Mouth: Mucous membranes are moist.      Pharynx: Oropharynx is clear.   Eyes:      Conjunctiva/sclera: Conjunctivae normal.      Pupils: Pupils are equal, round, and reactive to light.   Cardiovascular:      Rate and Rhythm: Normal rate and regular rhythm.      Pulses: Normal pulses.      Heart sounds: Normal heart sounds.   Pulmonary:      Effort: Pulmonary effort is normal.      Breath sounds: Normal breath sounds.   Abdominal:      General: Abdomen is flat. Bowel sounds are normal.      Palpations: Abdomen is soft.   Musculoskeletal:         General: Normal range of motion.      Cervical back: Normal range of motion.   Skin:     General: Skin is warm and dry.   Neurological:      General: No focal deficit present.      Mental Status: She is alert and oriented to person, place, and time.   Psychiatric:         Mood and Affect: Mood normal.         Behavior: Behavior normal.         PFTs:  (independently reviewed and interpreted by me)  Ratio 82%  FVC 2.84L, " 78%  FEV1 2.33L, 79%      Radiology (independently reviewed and interpreted by me): none       Assessment/Plan     Diagnoses and all orders for this visit:    1. Preop examination (Primary)    2. Personal history of nicotine dependence         Discussion/ Recommendations:   Patient with normal PFTs, has recently quit smoking. No evidence of intrinsic pulmonary disease. She is at average risk for perioperative pulmonary complications. Encouraged to continue to abstain from smoking             No follow-ups on file.      Thank you for allowing me to participate in the care of Elijah Pottermel Crouch. Please do not hesitate to contact me with any questions.         This document has been electronically signed by Danita Wilson DO on September 23, 2021 14:48 CDT

## 2021-09-23 NOTE — PROGRESS NOTES
Basic Jaylon only.     Preop testing.     Good patient effort and cooperation.     Ordered by Katie, read by Katie.

## 2021-09-24 PROBLEM — Z01.810 PRE-OPERATIVE CARDIOVASCULAR EXAMINATION, HIGH RISK SURGERY: Status: ACTIVE | Noted: 2021-09-24

## 2021-09-24 PROBLEM — E66.01 MORBID OBESITY WITH BMI OF 40.0-44.9, ADULT: Status: ACTIVE | Noted: 2021-09-24

## 2021-10-14 ENCOUNTER — TELEPHONE (OUTPATIENT)
Dept: CARDIOLOGY | Facility: CLINIC | Age: 46
End: 2021-10-14

## 2021-10-14 LAB
QT INTERVAL: 404 MS
QTC INTERVAL: 454 MS

## 2021-10-14 NOTE — TELEPHONE ENCOUNTER
Contacted pt per Ro to give stress test results:   Treadmill Stress test results. Patient achieved 8.2 METS and achieved target heart rate. Negative exercise treadmill study for ischemia based on clinical and EKG criteria.  Above average exercise tolerance.  No arrhythmia noted during exercise. Low risk study. I will send surgical clearance and records to Dr. Wheat office.     Pt voiced understanding

## 2021-10-14 NOTE — TELEPHONE ENCOUNTER
----- Message from Kendall Epley, MA sent at 10/14/2021  8:57 AM CDT -----    ----- Message -----  From: Ro Retana APRN  Sent: 10/14/2021   8:15 AM CDT  To: [facility] Clinical Pool    Please call patient with Treadmill Stress test results. Patient achieved 8.2 METS and achieved target heart rate. Negative exercise treadmill study for ischemia based on clinical and EKG criteria.  Above average exercise tolerance.  No arrhythmia noted during exercise. Low risk study. I will send surgical clearance and records to Dr. Wheat office.

## 2022-02-26 ENCOUNTER — HOSPITAL ENCOUNTER (EMERGENCY)
Facility: HOSPITAL | Age: 47
Discharge: HOME | End: 2022-02-26
Attending: EMERGENCY MEDICINE | Admitting: EMERGENCY MEDICINE

## 2022-02-26 ENCOUNTER — APPOINTMENT (OUTPATIENT)
Dept: CT IMAGING | Facility: HOSPITAL | Age: 47
End: 2022-02-26

## 2022-02-26 ENCOUNTER — APPOINTMENT (OUTPATIENT)
Dept: GENERAL RADIOLOGY | Facility: HOSPITAL | Age: 47
End: 2022-02-26

## 2022-02-26 VITALS
HEART RATE: 66 BPM | TEMPERATURE: 98.1 F | OXYGEN SATURATION: 97 % | HEIGHT: 69 IN | DIASTOLIC BLOOD PRESSURE: 63 MMHG | BODY MASS INDEX: 36.43 KG/M2 | SYSTOLIC BLOOD PRESSURE: 110 MMHG | WEIGHT: 246 LBS | RESPIRATION RATE: 18 BRPM

## 2022-02-26 DIAGNOSIS — R10.13 EPIGASTRIC PAIN: ICD-10-CM

## 2022-02-26 DIAGNOSIS — R11.2 NON-INTRACTABLE VOMITING WITH NAUSEA, UNSPECIFIED VOMITING TYPE: Primary | ICD-10-CM

## 2022-02-26 LAB
ALBUMIN SERPL-MCNC: 4.1 G/DL (ref 3.5–5.2)
ALBUMIN/GLOB SERPL: 1.2 G/DL
ALP SERPL-CCNC: 60 U/L (ref 39–117)
ALT SERPL W P-5'-P-CCNC: 44 U/L (ref 1–33)
ANION GAP SERPL CALCULATED.3IONS-SCNC: 12 MMOL/L (ref 5–15)
AST SERPL-CCNC: 39 U/L (ref 1–32)
BASOPHILS # BLD AUTO: 0.02 10*3/MM3 (ref 0–0.2)
BASOPHILS NFR BLD AUTO: 0.4 % (ref 0–1.5)
BILIRUB SERPL-MCNC: 0.8 MG/DL (ref 0–1.2)
BUN SERPL-MCNC: 9 MG/DL (ref 6–20)
BUN/CREAT SERPL: 10.1 (ref 7–25)
CALCIUM SPEC-SCNC: 9.4 MG/DL (ref 8.6–10.5)
CHLORIDE SERPL-SCNC: 104 MMOL/L (ref 98–107)
CO2 SERPL-SCNC: 25 MMOL/L (ref 22–29)
CREAT SERPL-MCNC: 0.89 MG/DL (ref 0.57–1)
DEPRECATED RDW RBC AUTO: 43.5 FL (ref 37–54)
EOSINOPHIL # BLD AUTO: 0.11 10*3/MM3 (ref 0–0.4)
EOSINOPHIL NFR BLD AUTO: 2 % (ref 0.3–6.2)
ERYTHROCYTE [DISTWIDTH] IN BLOOD BY AUTOMATED COUNT: 13.9 % (ref 12.3–15.4)
GFR SERPL CREATININE-BSD FRML MDRD: 83 ML/MIN/1.73
GLOBULIN UR ELPH-MCNC: 3.4 GM/DL
GLUCOSE SERPL-MCNC: 113 MG/DL (ref 65–99)
HCG SERPL QL: NEGATIVE
HCT VFR BLD AUTO: 45.6 % (ref 34–46.6)
HGB BLD-MCNC: 14.8 G/DL (ref 12–15.9)
HOLD SPECIMEN: NORMAL
IMM GRANULOCYTES # BLD AUTO: 0.01 10*3/MM3 (ref 0–0.05)
IMM GRANULOCYTES NFR BLD AUTO: 0.2 % (ref 0–0.5)
LIPASE SERPL-CCNC: 14 U/L (ref 13–60)
LYMPHOCYTES # BLD AUTO: 2.21 10*3/MM3 (ref 0.7–3.1)
LYMPHOCYTES NFR BLD AUTO: 39.6 % (ref 19.6–45.3)
MAGNESIUM SERPL-MCNC: 2 MG/DL (ref 1.6–2.6)
MCH RBC QN AUTO: 27.8 PG (ref 26.6–33)
MCHC RBC AUTO-ENTMCNC: 32.5 G/DL (ref 31.5–35.7)
MCV RBC AUTO: 85.7 FL (ref 79–97)
MONOCYTES # BLD AUTO: 0.37 10*3/MM3 (ref 0.1–0.9)
MONOCYTES NFR BLD AUTO: 6.6 % (ref 5–12)
NEUTROPHILS NFR BLD AUTO: 2.86 10*3/MM3 (ref 1.7–7)
NEUTROPHILS NFR BLD AUTO: 51.2 % (ref 42.7–76)
NRBC BLD AUTO-RTO: 0 /100 WBC (ref 0–0.2)
NT-PROBNP SERPL-MCNC: 26.3 PG/ML (ref 0–450)
PLATELET # BLD AUTO: 202 10*3/MM3 (ref 140–450)
PMV BLD AUTO: 11 FL (ref 6–12)
POTASSIUM SERPL-SCNC: 3 MMOL/L (ref 3.5–5.2)
PROT SERPL-MCNC: 7.5 G/DL (ref 6–8.5)
RBC # BLD AUTO: 5.32 10*6/MM3 (ref 3.77–5.28)
SODIUM SERPL-SCNC: 141 MMOL/L (ref 136–145)
TROPONIN T SERPL-MCNC: <0.01 NG/ML (ref 0–0.03)
WBC NRBC COR # BLD: 5.58 10*3/MM3 (ref 3.4–10.8)

## 2022-02-26 PROCEDURE — 85025 COMPLETE CBC W/AUTO DIFF WBC: CPT | Performed by: EMERGENCY MEDICINE

## 2022-02-26 PROCEDURE — 25010000002 IOPAMIDOL 61 % SOLUTION: Performed by: EMERGENCY MEDICINE

## 2022-02-26 PROCEDURE — 96361 HYDRATE IV INFUSION ADD-ON: CPT

## 2022-02-26 PROCEDURE — 80053 COMPREHEN METABOLIC PANEL: CPT | Performed by: EMERGENCY MEDICINE

## 2022-02-26 PROCEDURE — 83880 ASSAY OF NATRIURETIC PEPTIDE: CPT | Performed by: EMERGENCY MEDICINE

## 2022-02-26 PROCEDURE — 74177 CT ABD & PELVIS W/CONTRAST: CPT

## 2022-02-26 PROCEDURE — 83690 ASSAY OF LIPASE: CPT | Performed by: EMERGENCY MEDICINE

## 2022-02-26 PROCEDURE — 93005 ELECTROCARDIOGRAM TRACING: CPT | Performed by: EMERGENCY MEDICINE

## 2022-02-26 PROCEDURE — 96374 THER/PROPH/DIAG INJ IV PUSH: CPT

## 2022-02-26 PROCEDURE — 71045 X-RAY EXAM CHEST 1 VIEW: CPT

## 2022-02-26 PROCEDURE — 71260 CT THORAX DX C+: CPT

## 2022-02-26 PROCEDURE — 84484 ASSAY OF TROPONIN QUANT: CPT | Performed by: EMERGENCY MEDICINE

## 2022-02-26 PROCEDURE — 84703 CHORIONIC GONADOTROPIN ASSAY: CPT | Performed by: EMERGENCY MEDICINE

## 2022-02-26 PROCEDURE — 93010 ELECTROCARDIOGRAM REPORT: CPT | Performed by: INTERNAL MEDICINE

## 2022-02-26 PROCEDURE — 83735 ASSAY OF MAGNESIUM: CPT | Performed by: EMERGENCY MEDICINE

## 2022-02-26 PROCEDURE — 0 POTASSIUM CHLORIDE 10 MEQ/100ML SOLUTION: Performed by: EMERGENCY MEDICINE

## 2022-02-26 PROCEDURE — 99284 EMERGENCY DEPT VISIT MOD MDM: CPT

## 2022-02-26 PROCEDURE — 25010000002 PROCHLORPERAZINE 10 MG/2ML SOLUTION: Performed by: EMERGENCY MEDICINE

## 2022-02-26 RX ORDER — POTASSIUM CHLORIDE 1.5 G/1.77G
20 POWDER, FOR SOLUTION ORAL DAILY
Qty: 7 PACKET | Refills: 0 | Status: SHIPPED | OUTPATIENT
Start: 2022-02-26 | End: 2022-03-05

## 2022-02-26 RX ORDER — SUCRALFATE ORAL 1 G/10ML
1 SUSPENSION ORAL 2 TIMES DAILY
COMMUNITY
End: 2022-11-30

## 2022-02-26 RX ORDER — POTASSIUM CHLORIDE 7.45 MG/ML
10 INJECTION INTRAVENOUS ONCE
Status: COMPLETED | OUTPATIENT
Start: 2022-02-26 | End: 2022-02-26

## 2022-02-26 RX ORDER — PROCHLORPERAZINE MALEATE 10 MG
10 TABLET ORAL EVERY 6 HOURS PRN
Qty: 10 TABLET | Refills: 0 | Status: SHIPPED | OUTPATIENT
Start: 2022-02-26 | End: 2022-11-30

## 2022-02-26 RX ORDER — SODIUM CHLORIDE 0.9 % (FLUSH) 0.9 %
10 SYRINGE (ML) INJECTION AS NEEDED
Status: DISCONTINUED | OUTPATIENT
Start: 2022-02-26 | End: 2022-02-26 | Stop reason: HOSPADM

## 2022-02-26 RX ORDER — PANTOPRAZOLE SODIUM 40 MG/1
40 TABLET, DELAYED RELEASE ORAL DAILY
COMMUNITY
End: 2022-11-30

## 2022-02-26 RX ORDER — SODIUM CHLORIDE 9 MG/ML
125 INJECTION, SOLUTION INTRAVENOUS CONTINUOUS
Status: DISCONTINUED | OUTPATIENT
Start: 2022-02-26 | End: 2022-02-26 | Stop reason: HOSPADM

## 2022-02-26 RX ORDER — POTASSIUM CHLORIDE 1.5 G/1.77G
40 POWDER, FOR SOLUTION ORAL ONCE
Status: COMPLETED | OUTPATIENT
Start: 2022-02-26 | End: 2022-02-26

## 2022-02-26 RX ORDER — PROCHLORPERAZINE EDISYLATE 5 MG/ML
10 INJECTION INTRAMUSCULAR; INTRAVENOUS ONCE
Status: COMPLETED | OUTPATIENT
Start: 2022-02-26 | End: 2022-02-26

## 2022-02-26 RX ADMIN — SODIUM CHLORIDE 125 ML/HR: 9 INJECTION, SOLUTION INTRAVENOUS at 06:05

## 2022-02-26 RX ADMIN — POTASSIUM CHLORIDE 10 MEQ: 7.46 INJECTION, SOLUTION INTRAVENOUS at 06:38

## 2022-02-26 RX ADMIN — POTASSIUM CHLORIDE 40 MEQ: 1.5 POWDER, FOR SOLUTION ORAL at 06:38

## 2022-02-26 RX ADMIN — PROCHLORPERAZINE EDISYLATE 10 MG: 5 INJECTION INTRAMUSCULAR; INTRAVENOUS at 04:59

## 2022-02-26 RX ADMIN — IOPAMIDOL 90 ML: 612 INJECTION, SOLUTION INTRAVENOUS at 05:32

## 2022-02-27 LAB
QT INTERVAL: 444 MS
QTC INTERVAL: 458 MS

## 2022-11-30 ENCOUNTER — OFFICE VISIT (OUTPATIENT)
Dept: GASTROENTEROLOGY | Facility: CLINIC | Age: 47
End: 2022-11-30

## 2022-11-30 VITALS
BODY MASS INDEX: 29.36 KG/M2 | HEART RATE: 57 BPM | HEIGHT: 69 IN | DIASTOLIC BLOOD PRESSURE: 89 MMHG | SYSTOLIC BLOOD PRESSURE: 131 MMHG | WEIGHT: 198.2 LBS

## 2022-11-30 DIAGNOSIS — R10.84 GENERALIZED ABDOMINAL PAIN: Primary | ICD-10-CM

## 2022-11-30 DIAGNOSIS — R19.7 DIARRHEA, UNSPECIFIED TYPE: ICD-10-CM

## 2022-11-30 DIAGNOSIS — R11.0 NAUSEA: ICD-10-CM

## 2022-11-30 PROCEDURE — 99204 OFFICE O/P NEW MOD 45 MIN: CPT | Performed by: INTERNAL MEDICINE

## 2022-11-30 RX ORDER — FAMOTIDINE 20 MG/1
20 TABLET, FILM COATED ORAL 2 TIMES DAILY
COMMUNITY
Start: 2022-11-05

## 2022-11-30 RX ORDER — DEXTROSE AND SODIUM CHLORIDE 5; .45 G/100ML; G/100ML
30 INJECTION, SOLUTION INTRAVENOUS CONTINUOUS PRN
Status: CANCELLED | OUTPATIENT
Start: 2023-01-11

## 2022-11-30 RX ORDER — DICYCLOMINE HCL 20 MG
20 TABLET ORAL EVERY 6 HOURS
Qty: 120 TABLET | Refills: 5 | Status: SHIPPED | OUTPATIENT
Start: 2022-11-30 | End: 2022-12-30

## 2022-11-30 RX ORDER — SODIUM CHLORIDE 9 MG/ML
40 INJECTION, SOLUTION INTRAVENOUS AS NEEDED
Status: CANCELLED | OUTPATIENT
Start: 2022-11-30

## 2022-11-30 RX ORDER — OMEPRAZOLE 40 MG/1
40 CAPSULE, DELAYED RELEASE ORAL DAILY
Qty: 30 CAPSULE | Refills: 11 | Status: SHIPPED | OUTPATIENT
Start: 2022-11-30

## 2022-12-19 NOTE — PROGRESS NOTES
[facility]      Chief Complaint:   Chief Complaint   Patient presents with   • Abdominal Cramping   • Nausea   • Vomiting       Subjective     HPI:   Ms. Crouch is a 47-year-old -American female with past medical history of migraines, refractive error, gastric sleeve placement by Dr. Wheat in January centimeters presenting for evaluation for abdominal pain with nausea and regurgitation.  She was unable to tolerate solid food since gastric bypass.  Has cramping abdominal pain with diarrhea.  Lost 90 pounds weight so far.  Denies rectal bleeding or melena.  Also denied NSAID usage.  Taking Pepcid without much help.    Past Medical History:   Past Medical History:   Diagnosis Date   • Migraines    • Wears glasses        Past Surgical History:  Past Surgical History:   Procedure Laterality Date   • BREAST LUMPECTOMY Right 1/23/2019    Procedure: Excision of chronically infected right breast tissue;  Surgeon: Seamus Colón MD;  Location: [facility];  Service: General   • ESSURE TUBAL LIGATION  2010   • GASTRIC SLEEVE LAPAROSCOPIC  01/05/2022   • HYSTEROSCOPY ENDOMETRIAL ABLATION     • INCISION AND DRAINAGE ABSCESS N/A 9/19/2018    Procedure: INCISION AND DRAINAGE OF RIGHT BREAST AND LEFT FLANK  ABSCESSES;  Surgeon: Seamus Colón MD;  Location: [facility];  Service: General   • INCISION AND DRAINAGE ABSCESS Right 11/5/2018    Procedure: INCISION AND DRAINAGE ABSCESS right breast;  Surgeon: Seamus Colón MD;  Location: [facility];  Service: General   • INCISION AND DRAINAGE ABSCESS Right 12/21/2018    Procedure: Incision and Drainage of Right Breast Abscess;  Surgeon: Seamus Colón MD;  Location: [facility];  Service: General       Family History:  Family History   Problem Relation Age of Onset   • Breast cancer Mother        Social History:   reports that she quit smoking about 4 years ago. She has a 3.50 pack-year smoking history. She has never used  smokeless tobacco. She reports that she does not drink alcohol and does not use drugs.    Medications:   Prior to Admission medications    Medication Sig Start Date End Date Taking? Authorizing Provider   famotidine (PEPCID) 20 MG tablet Take 20 mg by mouth 2 (Two) Times a Day. 11/5/22  Yes Provider, MD Mariajose   dicyclomine (BENTYL) 20 MG tablet Take 1 tablet by mouth Every 6 (Six) Hours for 30 days. 11/30/22 12/30/22  Jakob Hope MD   omeprazole (priLOSEC) 40 MG capsule Take 1 capsule by mouth Daily. 11/30/22   Jakob Hope MD   polyethylene glycol (GoLYTELY) 236 g solution Please use the instructions given in office 11/30/22   Jakob Hope MD       Allergies:  Hydrocodone-acetaminophen    ROS:    Review of Systems   Constitutional: Positive for unexpected weight change. Negative for chills, fatigue and fever.   HENT: Negative for congestion, ear discharge, hearing loss, nosebleeds and sore throat.    Eyes: Negative for pain, discharge and redness.   Respiratory: Negative for cough, chest tightness, shortness of breath and wheezing.    Cardiovascular: Negative for chest pain and palpitations.   Gastrointestinal: Positive for abdominal pain and diarrhea. Negative for abdominal distention, blood in stool, constipation, nausea and vomiting.   Endocrine: Negative for cold intolerance, polydipsia, polyphagia and polyuria.   Genitourinary: Negative for dysuria, flank pain, frequency, hematuria and urgency.   Musculoskeletal: Negative for arthralgias, back pain, joint swelling and myalgias.   Skin: Negative for color change, pallor and rash.   Neurological: Negative for tremors, seizures, syncope, weakness and headaches.   Hematological: Negative for adenopathy. Does not bruise/bleed easily.   Psychiatric/Behavioral: Negative for behavioral problems, confusion, dysphoric mood, hallucinations and suicidal ideas. The patient is not nervous/anxious.    Has GERD and regurgitation.  Objective     BP  "131/89 (BP Location: Left arm)   Pulse 57   Ht 175.3 cm (69\")   Wt 89.9 kg (198 lb 3.2 oz)   BMI 29.27 kg/m²     Physical Exam  Constitutional:       Appearance: She is well-developed.   HENT:      Head: Normocephalic and atraumatic.   Eyes:      Conjunctiva/sclera: Conjunctivae normal.      Pupils: Pupils are equal, round, and reactive to light.   Neck:      Thyroid: No thyromegaly.   Cardiovascular:      Rate and Rhythm: Normal rate and regular rhythm.      Heart sounds: Normal heart sounds. No murmur heard.  Pulmonary:      Effort: Pulmonary effort is normal.      Breath sounds: Normal breath sounds. No wheezing.   Abdominal:      General: Bowel sounds are normal. There is no distension.      Palpations: Abdomen is soft. There is no mass.      Tenderness: There is no abdominal tenderness.      Hernia: No hernia is present.   Genitourinary:     Comments: No lesions noted  Musculoskeletal:         General: No tenderness. Normal range of motion.      Cervical back: Normal range of motion and neck supple.   Lymphadenopathy:      Cervical: No cervical adenopathy.   Skin:     General: Skin is warm and dry.      Findings: No rash.   Neurological:      Mental Status: She is alert and oriented to person, place, and time.      Cranial Nerves: No cranial nerve deficit.   Psychiatric:         Thought Content: Thought content normal.        Extremities: No edema, cyanosis or clubbing.    Assessment & Plan    1.  Abdominal pain with nausea, regurgitation and weight loss rule out peptic ulcer disease, gastritis and gastroparesis.  Add Prilosec 40 mg p.o. daily.  Continue Pepcid.  Proceed with EGD for further evaluation.  Gastric emptying scan if EGD is unremarkable.  2.  Abdominal pain with diarrhea and weight loss rule out IBD and colorectal neoplasia.  Add Bentyl and high-fiber diet.  Proceed with colonoscopy for further evaluation.  3.  High BMI withrecent significant weight loss, add p.o. nutritional " supplements.  Diagnoses and all orders for this visit:    1. Generalized abdominal pain (Primary)  -     Case Request; Standing  -     sodium chloride 0.9 % infusion 40 mL  -     dextrose 5 % and sodium chloride 0.45 % infusion  -     Case Request    2. Nausea  -     Case Request; Standing  -     sodium chloride 0.9 % infusion 40 mL  -     dextrose 5 % and sodium chloride 0.45 % infusion  -     Case Request    3. Diarrhea, unspecified type  -     Case Request; Standing  -     sodium chloride 0.9 % infusion 40 mL  -     dextrose 5 % and sodium chloride 0.45 % infusion  -     Case Request    Other orders  -     Follow Anesthesia Guidelines / Protocol; Future  -     Obtain Informed Consent; Future  -     Implement Anesthesia Orders Day of Procedure; Standing  -     Obtain Informed Consent; Standing  -     POC Glucose Once; Standing  -     Insert Peripheral IV; Standing  -     omeprazole (priLOSEC) 40 MG capsule; Take 1 capsule by mouth Daily.  Dispense: 30 capsule; Refill: 11  -     dicyclomine (BENTYL) 20 MG tablet; Take 1 tablet by mouth Every 6 (Six) Hours for 30 days.  Dispense: 120 tablet; Refill: 5  -     polyethylene glycol (GoLYTELY) 236 g solution; Please use the instructions given in office  Dispense: 4000 mL; Refill: 0        ESOPHAGOGASTRODUODENOSCOPY (N/A), COLONOSCOPY (N/A)     Diagnosis Plan   1. Generalized abdominal pain  Case Request    sodium chloride 0.9 % infusion 40 mL    dextrose 5 % and sodium chloride 0.45 % infusion    Case Request      2. Nausea  Case Request    sodium chloride 0.9 % infusion 40 mL    dextrose 5 % and sodium chloride 0.45 % infusion    Case Request      3. Diarrhea, unspecified type  Case Request    sodium chloride 0.9 % infusion 40 mL    dextrose 5 % and sodium chloride 0.45 % infusion    Case Request          Anticipated Surgical Procedure:  Orders Placed This Encounter   Procedures   • Obtain Informed Consent     Standing Status:   Future     Order Specific Question:    Informed Consent Given For     Answer:   egd and colonoscopy       The risks, benefits, and alternatives of this procedure have been discussed with the patient or the responsible party- the patient understands and agrees to proceed.            This document has been electronically signed by Jakob Hope MD on December 18, 2022 20:45 CST

## 2023-01-11 ENCOUNTER — HOSPITAL ENCOUNTER (OUTPATIENT)
Facility: HOSPITAL | Age: 48
Setting detail: HOSPITAL OUTPATIENT SURGERY
Discharge: HOME | End: 2023-01-11
Attending: INTERNAL MEDICINE | Admitting: INTERNAL MEDICINE
Payer: COMMERCIAL

## 2023-01-11 ENCOUNTER — ANESTHESIA EVENT (OUTPATIENT)
Dept: GASTROENTEROLOGY | Facility: HOSPITAL | Age: 48
End: 2023-01-11
Payer: COMMERCIAL

## 2023-01-11 ENCOUNTER — ANESTHESIA (OUTPATIENT)
Dept: GASTROENTEROLOGY | Facility: HOSPITAL | Age: 48
End: 2023-01-11
Payer: COMMERCIAL

## 2023-01-11 VITALS
DIASTOLIC BLOOD PRESSURE: 61 MMHG | OXYGEN SATURATION: 97 % | SYSTOLIC BLOOD PRESSURE: 107 MMHG | BODY MASS INDEX: 27.85 KG/M2 | TEMPERATURE: 97 F | HEIGHT: 69 IN | WEIGHT: 188 LBS | RESPIRATION RATE: 18 BRPM | HEART RATE: 71 BPM

## 2023-01-11 DIAGNOSIS — R19.7 DIARRHEA, UNSPECIFIED TYPE: ICD-10-CM

## 2023-01-11 DIAGNOSIS — R10.84 GENERALIZED ABDOMINAL PAIN: ICD-10-CM

## 2023-01-11 DIAGNOSIS — R11.0 NAUSEA: ICD-10-CM

## 2023-01-11 PROCEDURE — 45380 COLONOSCOPY AND BIOPSY: CPT | Performed by: INTERNAL MEDICINE

## 2023-01-11 PROCEDURE — 43239 EGD BIOPSY SINGLE/MULTIPLE: CPT | Performed by: INTERNAL MEDICINE

## 2023-01-11 PROCEDURE — 25010000002 PROPOFOL 10 MG/ML EMULSION

## 2023-01-11 RX ORDER — DEXTROSE AND SODIUM CHLORIDE 5; .45 G/100ML; G/100ML
30 INJECTION, SOLUTION INTRAVENOUS CONTINUOUS PRN
Status: DISCONTINUED | OUTPATIENT
Start: 2023-01-11 | End: 2023-01-11 | Stop reason: HOSPADM

## 2023-01-11 RX ORDER — PROPOFOL 10 MG/ML
VIAL (ML) INTRAVENOUS AS NEEDED
Status: DISCONTINUED | OUTPATIENT
Start: 2023-01-11 | End: 2023-01-11 | Stop reason: SURG

## 2023-01-11 RX ORDER — LIDOCAINE HYDROCHLORIDE 20 MG/ML
INJECTION, SOLUTION INTRAVENOUS AS NEEDED
Status: DISCONTINUED | OUTPATIENT
Start: 2023-01-11 | End: 2023-01-11 | Stop reason: SURG

## 2023-01-11 RX ADMIN — PROPOFOL 80 MG: 10 INJECTION, EMULSION INTRAVENOUS at 10:52

## 2023-01-11 RX ADMIN — PROPOFOL 20 MG: 10 INJECTION, EMULSION INTRAVENOUS at 11:05

## 2023-01-11 RX ADMIN — LIDOCAINE HYDROCHLORIDE 100 MG: 20 INJECTION, SOLUTION INTRAVENOUS at 10:52

## 2023-01-11 RX ADMIN — PROPOFOL 20 MG: 10 INJECTION, EMULSION INTRAVENOUS at 10:57

## 2023-01-11 RX ADMIN — PROPOFOL 20 MG: 10 INJECTION, EMULSION INTRAVENOUS at 11:04

## 2023-01-11 RX ADMIN — PROPOFOL 20 MG: 10 INJECTION, EMULSION INTRAVENOUS at 11:00

## 2023-01-11 RX ADMIN — PROPOFOL 40 MG: 10 INJECTION, EMULSION INTRAVENOUS at 10:53

## 2023-01-11 RX ADMIN — PROPOFOL 40 MG: 10 INJECTION, EMULSION INTRAVENOUS at 10:54

## 2023-01-11 RX ADMIN — PROPOFOL 20 MG: 10 INJECTION, EMULSION INTRAVENOUS at 11:01

## 2023-01-11 RX ADMIN — DEXTROSE AND SODIUM CHLORIDE 30 ML/HR: 5; 450 INJECTION, SOLUTION INTRAVENOUS at 10:15

## 2023-01-11 RX ADMIN — PROPOFOL 20 MG: 10 INJECTION, EMULSION INTRAVENOUS at 10:56

## 2023-01-11 RX ADMIN — PROPOFOL 20 MG: 10 INJECTION, EMULSION INTRAVENOUS at 10:58

## 2023-01-11 RX ADMIN — PROPOFOL 20 MG: 10 INJECTION, EMULSION INTRAVENOUS at 11:02

## 2023-01-11 NOTE — ANESTHESIA POSTPROCEDURE EVALUATION
Patient: Elijah Andres McNary    Procedure Summary     Date: 01/11/23 Room / Location: [facility] ENDOSCOPY 2 / [facility] ENDOSCOPY    Anesthesia Start: 1046 Anesthesia Stop: 1106    Procedures:       ESOPHAGOGASTRODUODENOSCOPY      COLONOSCOPY Diagnosis:       Generalized abdominal pain      Nausea      Diarrhea, unspecified type      (Generalized abdominal pain [R10.84])      (Nausea [R11.0])      (Diarrhea, unspecified type [R19.7])    Surgeons: Jakob Hope MD Provider: Emily Rivero CRNA    Anesthesia Type: general ASA Status: 3          Anesthesia Type: general    Vitals  No vitals data found for the desired time range.          Post Anesthesia Care and Evaluation    Patient location during evaluation: bedside  Patient participation: waiting for patient participation  Level of consciousness: responsive to verbal stimuli  Pain management: adequate    Airway patency: patent  Anesthetic complications: No anesthetic complications  PONV Status: none  Cardiovascular status: acceptable  Respiratory status: acceptable  Hydration status: acceptable    Comments: ---------------------------               01/11/23                      1005         ---------------------------   BP:          124/69         Pulse:         68           Resp:          18           Temp:   97.7 °F (36.5 °C)   SpO2:          95%         ---------------------------

## 2023-01-11 NOTE — H&P
[facility]      Chief Complaint:   No chief complaint on file.      Subjective     HPI:   Ms. Crouch is a 47-year-old -American female with past medical history of migraines, refractive error, gastric sleeve placement by Dr. Wheat in January centimeters presenting for evaluation for abdominal pain with nausea and regurgitation.  She was unable to tolerate solid food since gastric bypass.  Has cramping abdominal pain with diarrhea.  Lost 90 pounds weight so far.  Denies rectal bleeding or melena.  Also denied NSAID usage.  Taking Pepcid without much help.    Past Medical History:   Past Medical History:   Diagnosis Date   • Migraines    • Wears glasses        Past Surgical History:    Past Surgical History:   Procedure Laterality Date   • BREAST LUMPECTOMY Right 1/23/2019    Procedure: Excision of chronically infected right breast tissue;  Surgeon: Seamus Colón MD;  Location: [facility];  Service: General   • ESSURE TUBAL LIGATION  2010   • GASTRIC SLEEVE LAPAROSCOPIC  01/05/2022   • HYSTEROSCOPY ENDOMETRIAL ABLATION     • INCISION AND DRAINAGE ABSCESS N/A 9/19/2018    Procedure: INCISION AND DRAINAGE OF RIGHT BREAST AND LEFT FLANK  ABSCESSES;  Surgeon: Seamus Colón MD;  Location: [facility];  Service: General   • INCISION AND DRAINAGE ABSCESS Right 11/5/2018    Procedure: INCISION AND DRAINAGE ABSCESS right breast;  Surgeon: Seamus Colón MD;  Location: [facility];  Service: General   • INCISION AND DRAINAGE ABSCESS Right 12/21/2018    Procedure: Incision and Drainage of Right Breast Abscess;  Surgeon: Seamus Colón MD;  Location: [facility];  Service: General       Family History:  Family History   Problem Relation Age of Onset   • Breast cancer Mother        Social History:   reports that she quit smoking about 4 years ago. She has a 3.50 pack-year smoking history. She has never used smokeless tobacco. She reports that she does not drink  alcohol and does not use drugs.    Medications:   Prior to Admission medications    Medication Sig Start Date End Date Taking? Authorizing Provider   famotidine (PEPCID) 20 MG tablet Take 20 mg by mouth 2 (Two) Times a Day. 11/5/22  Yes Provider, MD Mariajose   dicyclomine (BENTYL) 20 MG tablet Take 1 tablet by mouth Every 6 (Six) Hours for 30 days. 11/30/22 12/30/22  Jakob Hope MD   omeprazole (priLOSEC) 40 MG capsule Take 1 capsule by mouth Daily. 11/30/22   Jakob Hope MD   polyethylene glycol (GoLYTELY) 236 g solution Please use the instructions given in office 11/30/22   Jakob Hope MD       Allergies:  Hydrocodone-acetaminophen    ROS:    Review of Systems   Constitutional: Positive for unexpected weight change. Negative for chills, fatigue and fever.   HENT: Negative for congestion, ear discharge, hearing loss, nosebleeds and sore throat.    Eyes: Negative for pain, discharge and redness.   Respiratory: Negative for cough, chest tightness, shortness of breath and wheezing.    Cardiovascular: Negative for chest pain and palpitations.   Gastrointestinal: Positive for abdominal pain and diarrhea. Negative for abdominal distention, blood in stool, constipation, nausea and vomiting.   Endocrine: Negative for cold intolerance, polydipsia, polyphagia and polyuria.   Genitourinary: Negative for dysuria, flank pain, frequency, hematuria and urgency.   Musculoskeletal: Negative for arthralgias, back pain, joint swelling and myalgias.   Skin: Negative for color change, pallor and rash.   Neurological: Negative for tremors, seizures, syncope, weakness and headaches.   Hematological: Negative for adenopathy. Does not bruise/bleed easily.   Psychiatric/Behavioral: Negative for behavioral problems, confusion, dysphoric mood, hallucinations and suicidal ideas. The patient is not nervous/anxious.    Has GERD and regurgitation.  Objective     /69   Pulse 68   Temp 97.7 °F (36.5 °C)   Resp 18    "Ht 175.3 cm (69\")   Wt 85.3 kg (188 lb)   SpO2 95%   BMI 27.76 kg/m²     Physical Exam  Constitutional:       Appearance: She is well-developed.   HENT:      Head: Normocephalic and atraumatic.   Eyes:      Conjunctiva/sclera: Conjunctivae normal.      Pupils: Pupils are equal, round, and reactive to light.   Neck:      Thyroid: No thyromegaly.   Cardiovascular:      Rate and Rhythm: Normal rate and regular rhythm.      Heart sounds: Normal heart sounds. No murmur heard.  Pulmonary:      Effort: Pulmonary effort is normal.      Breath sounds: Normal breath sounds. No wheezing.   Abdominal:      General: Bowel sounds are normal. There is no distension.      Palpations: Abdomen is soft. There is no mass.      Tenderness: There is no abdominal tenderness.      Hernia: No hernia is present.   Genitourinary:     Comments: No lesions noted  Musculoskeletal:         General: No tenderness. Normal range of motion.      Cervical back: Normal range of motion and neck supple.   Lymphadenopathy:      Cervical: No cervical adenopathy.   Skin:     General: Skin is warm and dry.      Findings: No rash.   Neurological:      Mental Status: She is alert and oriented to person, place, and time.      Cranial Nerves: No cranial nerve deficit.   Psychiatric:         Thought Content: Thought content normal.        Extremities: No edema, cyanosis or clubbing.    Assessment & Plan    1.  Abdominal pain with nausea, regurgitation and weight loss rule out peptic ulcer disease, gastritis and gastroparesis.  Add Prilosec 40 mg p.o. daily.  Continue Pepcid.  Proceed with EGD for further evaluation.  Gastric emptying scan if EGD is unremarkable.  2.  Abdominal pain with diarrhea and weight loss rule out IBD and colorectal neoplasia.  Add Bentyl and high-fiber diet.  Proceed with colonoscopy for further evaluation.  3.  High BMI withrecent significant weight loss, add p.o. nutritional supplements.  Diagnoses and all orders for this visit:    1. " Nausea  -     dextrose 5 % and sodium chloride 0.45 % infusion    2. Generalized abdominal pain  -     dextrose 5 % and sodium chloride 0.45 % infusion    3. Diarrhea, unspecified type  -     dextrose 5 % and sodium chloride 0.45 % infusion    Other orders  -     Implement Anesthesia Orders Day of Procedure; Standing  -     Obtain Informed Consent; Standing  -     POC Glucose Once; Standing  -     Insert Peripheral IV; Standing  -     Implement Anesthesia Orders Day of Procedure  -     Obtain Informed Consent  -     POC Glucose Once  -     Insert Peripheral IV        ESOPHAGOGASTRODUODENOSCOPY (N/A), COLONOSCOPY (N/A)     Diagnosis Plan   1. Nausea  dextrose 5 % and sodium chloride 0.45 % infusion      2. Generalized abdominal pain  dextrose 5 % and sodium chloride 0.45 % infusion      3. Diarrhea, unspecified type  dextrose 5 % and sodium chloride 0.45 % infusion          Anticipated Surgical Procedure:  Orders Placed This Encounter   Procedures   • Implement Anesthesia Orders Day of Procedure     Standing Status:   Standing     Number of Occurrences:   1   • Obtain Informed Consent     Standing Status:   Standing     Number of Occurrences:   1     Order Specific Question:   Informed Consent Given For     Answer:   egd and colonoscopy   • POC Glucose Once     Prior to Procedure on ALL Diabetic Patients     Standing Status:   Standing     Number of Occurrences:   1   • Insert Peripheral IV     Standing Status:   Standing     Number of Occurrences:   1       The risks, benefits, and alternatives of this procedure have been discussed with the patient or the responsible party- the patient understands and agrees to proceed.            This document has been electronically signed by Jakob Hope MD on January 11, 2023 10:18 CST

## 2023-01-11 NOTE — ANESTHESIA PREPROCEDURE EVALUATION
Anesthesia Evaluation     Patient summary reviewed   no history of anesthetic complications:  NPO Solid Status: > 8 hours  NPO Liquid Status: > 8 hours           Airway   Mallampati: II  TM distance: >3 FB  Neck ROM: full  No difficulty expected  Dental    (+) poor dentition        Pulmonary - normal exam    breath sounds clear to auscultation  (+) a smoker (Quit smoking December 2018.) Former,   Cardiovascular - negative cardio ROS and normal exam  Exercise tolerance: good (4-7 METS)    NYHA Classification: II  Rhythm: regular  Rate: normal    (-) murmur      Neuro/Psych  (+) headaches (migraines and takes ibuprofen), psychiatric history Anxiety,    GI/Hepatic/Renal/Endo    (+)  GERD well controlled,      ROS Comment: Breast abscess    Musculoskeletal (-) negative ROS    Abdominal    Substance History - negative use     OB/GYN negative ob/gyn ROS         Other - negative ROS                         Anesthesia Plan    ASA 3     general   total IV anesthesia  intravenous induction     Anesthetic plan, risks, benefits, and alternatives have been provided, discussed and informed consent has been obtained with: patient.    Plan discussed with CRNA.

## 2023-01-12 LAB — REF LAB TEST METHOD: NORMAL

## 2023-01-18 ENCOUNTER — OFFICE VISIT (OUTPATIENT)
Dept: GASTROENTEROLOGY | Facility: CLINIC | Age: 48
End: 2023-01-18
Payer: COMMERCIAL

## 2023-01-18 VITALS
BODY MASS INDEX: 28.32 KG/M2 | HEIGHT: 69 IN | WEIGHT: 191.2 LBS | SYSTOLIC BLOOD PRESSURE: 124 MMHG | DIASTOLIC BLOOD PRESSURE: 83 MMHG | HEART RATE: 60 BPM

## 2023-01-18 DIAGNOSIS — K59.09 OTHER CONSTIPATION: ICD-10-CM

## 2023-01-18 DIAGNOSIS — R10.84 GENERALIZED ABDOMINAL PAIN: Primary | ICD-10-CM

## 2023-01-18 PROCEDURE — 99213 OFFICE O/P EST LOW 20 MIN: CPT | Performed by: INTERNAL MEDICINE

## 2023-01-18 RX ORDER — ADALIMUMAB 40MG/0.8ML
KIT SUBCUTANEOUS
COMMUNITY
Start: 2023-01-05

## 2023-01-18 RX ORDER — POLYETHYLENE GLYCOL 3350 17 G/17G
17 POWDER, FOR SOLUTION ORAL DAILY
Qty: 30 PACKET | Refills: 6 | Status: SHIPPED | OUTPATIENT
Start: 2023-01-18 | End: 2023-02-17

## 2023-02-06 NOTE — PROGRESS NOTES
Chief Complaint   Patient presents with   • Abdominal Pain   • Nausea   • Vomiting       Subjective    Elijah Crouch is a 47 y.o. female.    History of Present Illness  Patient presently Lives here.  Feels better currently.  Has intermittent constipation.  Abdominal pain has improved.  Denies nausea or vomiting.  Weight is stable.  On Humira.  EGD was consistent with hiatal hernia, esophagitis and gastritis.  Path was consistent with reactive gastropathy.  Colonoscopy was consistent with hemorrhoids.  Path was unremarkable.       The following portions of the patient's history were reviewed and updated as appropriate:   Past Medical History:   Diagnosis Date   • GERD (gastroesophageal reflux disease) 012022   • Migraines    • Wears glasses      Past Surgical History:   Procedure Laterality Date   • ABDOMINAL SURGERY  [id number]    Sleeve   • BARIATRIC SURGERY  [id number]   • BREAST LUMPECTOMY Right 01/23/2019    Procedure: Excision of chronically infected right breast tissue;  Surgeon: Seamus Colón MD;  [facility];  Service: General   • CHOLECYSTECTOMY     • COLONOSCOPY N/A 1/11/2023    Procedure: COLONOSCOPY;  Surgeon: Jakob Hope MD;  Location: [facility] ENDOSCOPY;  Service: Gastroenterology;  Laterality: N/A;   • ENDOSCOPY N/A 1/11/2023    Procedure: ESOPHAGOGASTRODUODENOSCOPY;  Surgeon: Jakob Hope MD;  Location: [facility] ENDOSCOPY;  Service: Gastroenterology;  Laterality: N/A;   • ESSURE TUBAL LIGATION  2010   • GASTRIC SLEEVE LAPAROSCOPIC  01/05/2022   • HYSTERECTOMY     • HYSTEROSCOPY ENDOMETRIAL ABLATION     • INCISION AND DRAINAGE ABSCESS N/A 09/19/2018    Procedure: INCISION AND DRAINAGE OF RIGHT BREAST AND LEFT FLANK  ABSCESSES;  Surgeon: Seamus Colón MD;  Location: [facility] OR;  Service: General   • INCISION AND DRAINAGE ABSCESS Right 11/05/2018    Procedure: INCISION AND DRAINAGE ABSCESS right breast;  Surgeon: Seamus Colón MD;  Location: [facility] OR;   Service: General   • INCISION AND DRAINAGE ABSCESS Right 2018    Procedure: Incision and Drainage of Right Breast Abscess;  Surgeon: Seamus Colón MD;  Location: [facility];  Service: General     Family History   Problem Relation Age of Onset   • Breast cancer Mother      OB History             Para        Term   0            AB        Living           SAB        IAB        Ectopic        Molar        Multiple        Live Births                  Prior to Admission medications    Medication Sig Start Date End Date Taking? Authorizing Provider   famotidine (PEPCID) 20 MG tablet Take 20 mg by mouth 2 (Two) Times a Day. 22  Yes Mariajose Novoa MD   Humira Pen 40 MG/0.8ML Pen-injector Kit INJECT 1.6MG SUBCUTANEOUSLY ON DAY ONE THEN 0.8MG SUBCUTANEOUSLY ON DAY 15 THEN 0.4MG SUBCUTANEOUSLY EVERY WEEK STARTING ON DAY 29 23  Yes Mariajose Novoa MD   omeprazole (priLOSEC) 40 MG capsule Take 1 capsule by mouth Daily. 22  Yes Jakob Hope MD   polyethylene glycol (GoLYTELY) 236 g solution Please use the instructions given in office 22   Jakob Hope MD   polyethylene glycol (MIRALAX) 17 g packet Take 17 g by mouth Daily for 30 days. 23  Jakob Hope MD     Allergies   Allergen Reactions   • Hydrocodone-Acetaminophen Nausea And Vomiting     Social History     Socioeconomic History   • Marital status: Single   Tobacco Use   • Smoking status: Former     Packs/day: 0.50     Years: 10.00     Pack years: 5.00     Types: Cigarettes     Quit date: 2021     Years since quittin.1   • Smokeless tobacco: Never   Vaping Use   • Vaping Use: Never used   Substance and Sexual Activity   • Alcohol use: No   • Drug use: No   • Sexual activity: Not Currently     Partners: Male     Birth control/protection: Essure       Review of Systems  Review of Systems   Constitutional: Negative for chills, fatigue, fever and unexpected weight change.  "  HENT: Negative for congestion, ear discharge, hearing loss, nosebleeds and sore throat.    Eyes: Negative for pain, discharge and redness.   Respiratory: Negative for cough, chest tightness, shortness of breath and wheezing.    Cardiovascular: Negative for chest pain and palpitations.   Gastrointestinal: Positive for abdominal pain and constipation. Negative for abdominal distention, blood in stool, diarrhea, nausea and vomiting.   Endocrine: Negative for cold intolerance, polydipsia, polyphagia and polyuria.   Genitourinary: Negative for dysuria, flank pain, frequency, hematuria and urgency.   Musculoskeletal: Negative for arthralgias, back pain, joint swelling and myalgias.   Skin: Negative for color change, pallor and rash.   Neurological: Negative for tremors, seizures, syncope, weakness and headaches.   Hematological: Negative for adenopathy. Does not bruise/bleed easily.   Psychiatric/Behavioral: Negative for behavioral problems, confusion, dysphoric mood, hallucinations and suicidal ideas. The patient is not nervous/anxious.         /83 (BP Location: Left arm)   Pulse 60   Ht 175.3 cm (69\")   Wt 86.7 kg (191 lb 3.2 oz)   BMI 28.24 kg/m²     Objective    Physical Exam  Constitutional:       Appearance: She is well-developed.   HENT:      Head: Normocephalic and atraumatic.   Eyes:      Conjunctiva/sclera: Conjunctivae normal.      Pupils: Pupils are equal, round, and reactive to light.   Neck:      Thyroid: No thyromegaly.   Cardiovascular:      Rate and Rhythm: Normal rate and regular rhythm.      Heart sounds: Normal heart sounds. No murmur heard.  Pulmonary:      Effort: Pulmonary effort is normal.      Breath sounds: Normal breath sounds. No wheezing.   Abdominal:      General: Bowel sounds are normal. There is no distension.      Palpations: Abdomen is soft. There is no mass.      Tenderness: There is no abdominal tenderness.      Hernia: No hernia is present.   Genitourinary:     Comments: No " lesions noted  Musculoskeletal:         General: No tenderness. Normal range of motion.      Cervical back: Normal range of motion and neck supple.   Lymphadenopathy:      Cervical: No cervical adenopathy.   Skin:     General: Skin is warm and dry.      Findings: No rash.   Neurological:      Mental Status: She is alert and oriented to person, place, and time.      Cranial Nerves: No cranial nerve deficit.   Psychiatric:         Thought Content: Thought content normal.       Admission on 2023, Discharged on 2023   Component Date Value Ref Range Status   • Reference Lab Report 2023    Final                    Value:[facility]  [address] [place] [address]  Phone: [phone] or [phone]  Fax: [phone]  Qamar Roberts M.D., Medical Director    PATIENT NAME                           LABORATORY NO.  1800  LIBERTY MANZO.                  [id number]  [id number]                         AGE              SEX  SSN           CLIENT REF #  [facility]           47      1975  F    [id number]   [id number]    [place]                       REQUESTING M.D.     ATTENDING M.D.     COPY TO.  [address] [place] [address]                 JAIME LUCIO[address]  DATE COLLECTED      DATE RECEIVED      DATE REPORTED  2023    DIAGNOSIS:  A.   DUODENUM, BIOPSY:  Benign duodenum, negative for significant architectural distortion,  inflammatory infiltrate, neoplasia, malignancy    B.   GASTRIC ANTRUM, BIOPSY:  Reactive (chemical)                           gastropathy  Negative for intestinal metaplasia, neoplasia, malignancy  No Helicobacter-like organisms identified on H and E    C.   GE JUNCTION:  Fragments of tissue consistent with gastroesophageal junction  Negative for significant architectural distortion, inflammatory infiltrate,  neoplasia,  "malignancy  Negative for significant eosinophilia (less than 1 per HPF)  Negative for intestinal metaplasia, dysplasia    D.   TERMINAL ILEUM BIOPSY:  Benign small bowel, consistent with terminal ileum  Negative for significant architectural distortion, inflammatory infiltrate,  neoplasia, malignancy    E.   COLON, BIOPSY:  Benign colonic mucosa, negative for significant architectural distortion,  inflammatory infiltrate, neoplasia, malignancy    CLINICAL HISTORY:  Generalized abdominal pain, nausea, diarrhea, unspecified type    SPECIMENS RECEIVED:  A.  DUODENUM, BIOPSY  B.  GASTRIC ANTRUM, BIOPSY  C.  GE JUNCTION  D.  TERMINAL ILEUM BIOPSY  E.  COLON, BIOPSY    MICROSCOPIC                           DESCRIPTION:  Tissue blocks are prepared and slides are examined microscopically on all  specimens. See diagnosis for details.    Professional interpretation rendered by Dee Alcaraz M.D., F.C.A.P. at  [email or web address], [address] [place] [address].    GROSS DESCRIPTION:  A.  Specimen is received in 1 formalin filled container labeled \"duodenum\"  and consists of 2 portions of tan soft tissue measuring 0.7 x 0.5 x 0.2 cm in  aggregate.  Submitted entirely in 1 cassette.  MTH  B.  Specimen is received in 1 formalin filled container labeled \"gastric,  antrum\" and consists of 2 portions of tan soft tissue measuring 0.6 x 0.6 x  0.2 cm in aggregate.  Submitted entirely in 1 cassette.  C.  Specimen is received in 1 formalin filled container labeled \"EGJ\" and  consists of multiple portions of gray soft tissue measuring 0.8 x 0.5 x 0.1  cm in aggregate.  Submitted entirely in 1 cassette.  D.  Specimen is received in 1 formalin filled container labeled \"TI\" and  consists of 2                           portions of tan soft tissue measuring 0.4 x 0.3 x 0.2 cm in  aggregate.  Submitted entirely in 1 cassette.  E.  Specimen is received in 1 formalin filled container labeled \"colonic  mucosa\" and consists of 3 portions of " tan soft tissue measuring 0.5 x 0.4 x  0.2 cm in aggregate.  Submitted entirely in 1 cassette.    REVIEWED, DIAGNOSED AND ELECTRONICALLY  SIGNED BY:    Dee Alcaraz M.D., F.C.A.P.  CPT CODES:  88305x5       Assessment & Plan    No diagnosis found..   1.  Abdominal pain with constipation, improving.  Add MiraLAX.  Continue high-fiber diet.  2.  Abdominal pain with nausea, regurgitation and weight loss, improving.  Likely due to active gastropathy.  Continue Prilosec and Pepcid.  3.  High BMI, recommend exercise and diet control.    Orders placed during this encounter include:  No orders of the defined types were placed in this encounter.      * Surgery not found *    Review and/or summary of lab tests, radiology, procedures, medications. Review and summary of old records and obtaining of history. The risks and benefits of my recommendations, as well as other treatment options were discussed with the patient today. Questions were answered.    New Medications Ordered This Visit   Medications   • polyethylene glycol (MIRALAX) 17 g packet     Sig: Take 17 g by mouth Daily for 30 days.     Dispense:  30 packet     Refill:  6       Follow-up: Return in about 3 months (around 2023).               Results for orders placed or performed during the hospital encounter of 23   TISSUE EXAM, [facility] ([facility])    Specimen: A: Small Intestine, Duodenum; Tissue    B: Gastric, Antrum; Tissue    C: Esophagus; Tissue    D: Small Intestine, Ileum; Tissue    E: Large Intestine; Tissue   Result Value Ref Range    Reference Lab Report       [facility]  [address] [place] [address]  Phone: [phone] or [phone]  Fax: [phone]  Qamar Roberts M.D., Medical Director    PATIENT NAME                           LABORATORY NO.  1800  LIBERTY MANZO.                  [id number]  [id number]                         AGE              SEX  SSN           CLIENT REF  #  [facility]           47      1975  F    [id number]   [id number]    [place]                       REQUESTING M.D.     ATTENDING M.D.     COPY TO.  [address] [place] [address]                 [place] [address]             VENKATAA  DATE COLLECTED      DATE RECEIVED      DATE REPORTED  01/11/2023 01/11/2023 01/12/2023    DIAGNOSIS:  A.   DUODENUM, BIOPSY:  Benign duodenum, negative for significant architectural distortion,  inflammatory infiltrate, neoplasia, malignancy    B.   GASTRIC ANTRUM, BIOPSY:  Reactive (chemical)  gastropathy  Negative for intestinal metaplasia, neoplasia, malignancy  No Helicobacter-like organisms identified on H and E    C.   GE JUNCTION:  Fragments of tissue consistent with gastroesophageal junction  Negative for significant architectural distortion, inflammatory infiltrate,  neoplasia, malignancy  Negative for significant eosinophilia (less than 1 per HPF)  Negative for intestinal metaplasia, dysplasia    D.   TERMINAL ILEUM BIOPSY:  Benign small bowel, consistent with terminal ileum  Negative for significant architectural distortion, inflammatory infiltrate,  neoplasia, malignancy    E.   COLON, BIOPSY:  Benign colonic mucosa, negative for significant architectural distortion,  inflammatory infiltrate, neoplasia, malignancy    CLINICAL HISTORY:  Generalized abdominal pain, nausea, diarrhea, unspecified type    SPECIMENS RECEIVED:  A.  DUODENUM, BIOPSY  B.  GASTRIC ANTRUM, BIOPSY  C.  GE JUNCTION  D.  TERMINAL ILEUM BIOPSY  E.  COLON, BIOPSY    MICROSCOPIC  DESCRIPTION:  Tissue blocks are prepared and slides are examined microscopically on all  specimens. See diagnosis for details.    Professional interpretation rendered by Dee Alcaraz M.D., F.C.A.P. at  [employer], [address] [place] [address].    GROSS DESCRIPTION:  A.  Specimen is received in 1 formalin filled container labeled  "\"duodenum\"  and consists of 2 portions of tan soft tissue measuring 0.7 x 0.5 x 0.2 cm in  aggregate.  Submitted entirely in 1 cassette.  MTH  B.  Specimen is received in 1 formalin filled container labeled \"gastric,  antrum\" and consists of 2 portions of tan soft tissue measuring 0.6 x 0.6 x  0.2 cm in aggregate.  Submitted entirely in 1 cassette.  C.  Specimen is received in 1 formalin filled container labeled \"EGJ\" and  consists of multiple portions of gray soft tissue measuring 0.8 x 0.5 x 0.1  cm in aggregate.  Submitted entirely in 1 cassette.  D.  Specimen is received in 1 formalin filled container labeled \"TI\" and  consists of 2  portions of tan soft tissue measuring 0.4 x 0.3 x 0.2 cm in  aggregate.  Submitted entirely in 1 cassette.  E.  Specimen is received in 1 formalin filled container labeled \"colonic  mucosa\" and consists of 3 portions of tan soft tissue measuring 0.5 x 0.4 x  0.2 cm in aggregate.  Submitted entirely in 1 cassette.    REVIEWED, DIAGNOSED AND ELECTRONICALLY  SIGNED BY:    Dee Alcaraz M.D., F.C.A.P.  CPT CODES:  88305x5     Results for orders placed or performed during the hospital encounter of 02/26/22   Green Top (Gel)   Result Value Ref Range    Extra Tube Hold for add-ons.    CBC Auto Differential    Specimen: Blood   Result Value Ref Range    WBC 5.58 3.40 - 10.80 10*3/mm3    RBC 5.32 (H) 3.77 - 5.28 10*6/mm3    Hemoglobin 14.8 12.0 - 15.9 g/dL    Hematocrit 45.6 34.0 - 46.6 %    MCV 85.7 79.0 - 97.0 fL    MCH 27.8 26.6 - 33.0 pg    MCHC 32.5 31.5 - 35.7 g/dL    RDW 13.9 12.3 - 15.4 %    RDW-SD 43.5 37.0 - 54.0 fl    MPV 11.0 6.0 - 12.0 fL    Platelets 202 140 - 450 10*3/mm3    Neutrophil % 51.2 42.7 - 76.0 %    Lymphocyte % 39.6 19.6 - 45.3 %    Monocyte % 6.6 5.0 - 12.0 %    Eosinophil % 2.0 0.3 - 6.2 %    Basophil % 0.4 0.0 - 1.5 %    Immature Grans % 0.2 0.0 - 0.5 %    Neutrophils, Absolute 2.86 1.70 - 7.00 10*3/mm3    Lymphocytes, Absolute 2.21 0.70 - 3.10 10*3/mm3    " Monocytes, Absolute 0.37 0.10 - 0.90 10*3/mm3    Eosinophils, Absolute 0.11 0.00 - 0.40 10*3/mm3    Basophils, Absolute 0.02 0.00 - 0.20 10*3/mm3    Immature Grans, Absolute 0.01 0.00 - 0.05 10*3/mm3    nRBC 0.0 0.0 - 0.2 /100 WBC   Troponin    Specimen: Blood   Result Value Ref Range    Troponin T <0.010 0.000 - 0.030 ng/mL   hCG, Serum, Qualitative    Specimen: Blood   Result Value Ref Range    HCG Qualitative Negative Negative   BNP    Specimen: Blood   Result Value Ref Range    proBNP 26.3 0.0 - 450.0 pg/mL   Magnesium    Specimen: Blood   Result Value Ref Range    Magnesium 2.0 1.6 - 2.6 mg/dL   Lipase    Specimen: Blood   Result Value Ref Range    Lipase 14 13 - 60 U/L   Comprehensive Metabolic Panel    Specimen: Blood   Result Value Ref Range    Glucose 113 (H) 65 - 99 mg/dL    BUN 9 6 - 20 mg/dL    Creatinine 0.89 0.57 - 1.00 mg/dL    Sodium 141 136 - 145 mmol/L    Potassium 3.0 (L) 3.5 - 5.2 mmol/L    Chloride 104 98 - 107 mmol/L    CO2 25.0 22.0 - 29.0 mmol/L    Calcium 9.4 8.6 - 10.5 mg/dL    Total Protein 7.5 6.0 - 8.5 g/dL    Albumin 4.10 3.50 - 5.20 g/dL    ALT (SGPT) 44 (H) 1 - 33 U/L    AST (SGOT) 39 (H) 1 - 32 U/L    Alkaline Phosphatase 60 39 - 117 U/L    Total Bilirubin 0.8 0.0 - 1.2 mg/dL    eGFR  African Amer 83 >60 mL/min/1.73    Globulin 3.4 gm/dL    A/G Ratio 1.2 g/dL    BUN/Creatinine Ratio 10.1 7.0 - 25.0    Anion Gap 12.0 5.0 - 15.0 mmol/L   ECG 12 Lead   Result Value Ref Range    QT Interval 444 ms    QTC Interval 458 ms   Results for orders placed or performed in visit on 10/12/21   Treadmill Stress Test   Result Value Ref Range    Target HR (85%) 149 bpm    Max. Pred. HR (100%) 175 bpm   Results for orders placed or performed in visit on 09/23/21   ECG 12 Lead   Result Value Ref Range    QT Interval 404 ms    QTC Interval 454 ms   Results for orders placed or performed during the hospital encounter of 01/23/19   Tissue Pathology Exam    Specimen: Breast, Right; Tissue   Result Value Ref  Range    Case Report       Surgical Pathology Report                         Case: [id number]                                  Authorizing Provider:  ColónSeamus paul,   Collected:           01/23/2019 09:07 AM                                 MD                                                                           Ordering Location:     [facility]             Received:            01/23/2019 10:10 AM                                 [facility]                                                              Pathologist:           Anthony Giron MD                                                           Specimen:    Breast, Right, right breast tissue                                                         Final Diagnosis       SKIN AND SUBCUTIS, RIGHT BREAST, DEBRIDEMENT:  ACUTE AND CHRONIC INFLAMMATION WITH FIBROSIS, CONSISTENT WITH CHRONIC ABSCESS.      Gross Description       The specimen consists of a strip of skin measuring 1.4 x 0.3 cm in surface area and 0.7 cm in thickness.  Another fragment of fibrofatty tissue measures 1.3 x 1.0 x 0.6 cm.  The latter specimen is bisected, and both are submitted in one block.     Results for orders placed or performed during the hospital encounter of 12/21/18   Wound Culture - Wound, Breast, Right    Specimen: Breast, Right; Wound   Result Value Ref Range    Wound Culture Heavy growth (4+) Staphylococcus lugdunensis (A)     Gram Stain Many (4+) WBCs seen     Gram Stain Many (4+) Gram positive cocci in clusters     Gram Stain         Susceptibility    Staphylococcus lugdunensis - RIMA     Clindamycin  Resistant ug/ml     Levofloxacin*  Susceptible ug/ml      * Staphylococcus species may develop resistance during prolonged therapy with quinolones.  Isolates that are initially susceptible may become resistant within three to four days after initiation of therapy. Testing of repeat isolates may be warranted.     Oxacillin  Susceptible ug/ml     Tetracycline   "Susceptible ug/ml     Trimethoprim + Sulfamethoxazole  Susceptible ug/ml     Vancomycin  Susceptible ug/ml   Results for orders placed or performed during the hospital encounter of 11/05/18   Tissue Pathology Exam    Specimen: Breast, Right; Tissue   Result Value Ref Range    Case Report       Surgical Pathology Report                         Case: [id number]                                  Authorizing Provider:  Seamus Colón,   Collected:           11/05/2018 03:37 PM                                 MD                                                                           Ordering Location:     [facility]             Received:            11/06/2018 07:56 AM                                 [facility]                                                              Pathologist:           Qamar Tam MD                                                         Specimen:    Breast, Right                                                                              Final Diagnosis       ABSCESS, RIGHT BREAST:  ACTIVE CHRONIC ABSCESS AND CICATRICIAL FIBROSIS.  NEGATIVE FOR NEOPLASM.      Gross Description       The container is labeled \"right breast\" and has a discoid segment of yellowish-gray tissue measuring 2.0 x 1.4 x 0.7 cm.  The outer surface is now marked with ink.  The entire specimen is embedded as 1A.     Results for orders placed or performed during the hospital encounter of 09/19/18   Tissue Pathology Exam    Specimen: Breast, Right; Tissue   Result Value Ref Range    Case Report       Surgical Pathology Report                         Case: [id number]                                  Authorizing Provider:  Seamus Colón,   Collected:           09/19/2018 02:02 PM                                 MD                                                                           Ordering Location:     [facility]             Received:            09/20/2018 07:59 AM                   "               [facility]                                                              Pathologist:           Anthony Giron MD                                                           Specimen:    Breast, Right                                                                              Final Diagnosis       SKIN, RIGHT BREAST:  ABSCESS.      Gross Description       The specimen consists of a fusiform fragment of skin and subcutis measuring 1.9 x 0.7 cm in surface area and 0.9 cm in thickness.  The specimen is serially sectioned and entirely submitted in 1 block after the margins are marked with ink.      Special Stains       I ordered silver stain to evaluate fungal infection as a cause for the abscess.    Silver stain is negative for fungal organisms.     Results for orders placed or performed in visit on 12/15/14   Progesterone    Specimen: Blood   Result Value Ref Range    Progesterone 12.7 ng/mL   CBC and Differential    Specimen: Blood   Result Value Ref Range    WBC 8.1 3.2 - 9.8 x1000/uL    RBC 5.01 3.77 - 5.16 margarita/mm3    Hemoglobin 13.0 12.0 - 15.5 gm/dl    Hematocrit 41.3 35.0 - 45.0 %    MCV 82.4 80.0 - 98.0 fl    MCH 25.9 (L) 26.0 - 34.0 pg    MCHC 31.5 31.4 - 36.0 gm/dl    RDW 15.2 (H) 11.5 - 14.5 %    Platelets 264 150 - 450 x1000/mm3    MPV 11.1 8.0 - 12.0 fl    Neutrophil Rel % 65.2 37.0 - 80.0 %    Lymphocyte Rel % 26.9 10.0 - 50.0 %    Monocyte Rel % 5.4 0.0 - 12.0 %    Eosinophil Rel % 2.1 0.0 - 7.0 %    Basophil Rel % 0.2 0.0 - 2.0 %    Immature Granulocyte Rel % 0.20 0.00 - 0.50 %    Neutrophils Absolute 5.26 2.00 - 8.60 x1000/uL    Lymphocytes Absolute 2.17 0.60 - 4.20 x1000/uL    Monocytes Absolute 0.44 0.00 - 0.90 x1000/uL    Eosinophils Absolute 0.17 0.00 - 0.70 x1000/uL    Basophils Absolute 0.02 0.00 - 0.20 x1000/uL    Immature Granulocytes Absolute 0.020 0.005 - 0.022 x1000/uL   TSH    Specimen: Blood   Result Value Ref Range    TSH 1.45 0.46 - 4.68 uIU/ml   T4, free    Specimen:  Blood   Result Value Ref Range    Free T4 0.86 0.78 - 2.19 ng/dl   Luteinizing hormone    Specimen: Blood   Result Value Ref Range    LH 6.6 mIU/mL   Follicle stimulating hormone    Specimen: Blood   Result Value Ref Range    FSH 3.4 mIU/mL   Results for orders placed or performed in visit on 12/01/08   Converted (Historical) Gyn Cytology    Specimen: Other   Result Value Ref Range    Spec Descr 1 SPECIMEN(S): Cervical/Endocervical     Clinical Information       CLINICAL HISTORY: Reason for Pap Smear: Routine Smear, LMP: 11/10/08    Specimen Adequacy         SPECIMEN ADEQUACY:  Satisfactory for evaluation:  endocervical/transformation zone component  present.      Microorganisms         MICROORGANISMS:  Fungal organisms morphologically consistent with Candida species.      Final Diagnosis         INTERPRETATION:  Negative for intraepithelial lesion or malignancy.      CONVERTED (HISTORICAL) FINAL PATHOLOGIST       Diagnostician:  SABINO DELACRUZ([facility])  Cytotechnologist  Electronically Signed 12/04/2008      ICD9 Diagnosis Code 1 ICD-9: V72.31     DISCLAIMER       The Pap smear is a screening test that aids in the detection of cervical  cancer and cancer precursors.  Both false positive and false negative  results can occur.  The test should be used at regular intervals, and positive results  should be confirmed before definitive therapy.       *Note: Due to a large number of results and/or encounters for the requested time period, some results have not been displayed. A complete set of results can be found in Results Review.         This document has been electronically signed by Jakob Hope MD on February 6, 2023 07:31 CST

## 2023-04-18 ENCOUNTER — OFFICE VISIT (OUTPATIENT)
Dept: GASTROENTEROLOGY | Facility: CLINIC | Age: 48
End: 2023-04-18
Payer: COMMERCIAL

## 2023-04-18 VITALS
HEART RATE: 54 BPM | BODY MASS INDEX: 26.96 KG/M2 | HEIGHT: 69 IN | SYSTOLIC BLOOD PRESSURE: 130 MMHG | DIASTOLIC BLOOD PRESSURE: 80 MMHG | WEIGHT: 182 LBS

## 2023-04-18 DIAGNOSIS — R10.84 GENERALIZED ABDOMINAL PAIN: Primary | ICD-10-CM

## 2023-04-18 RX ORDER — FLUTICASONE PROPIONATE 50 MCG
2 SPRAY, SUSPENSION (ML) NASAL DAILY
COMMUNITY
Start: 2023-04-07

## 2023-04-18 RX ORDER — CETIRIZINE HYDROCHLORIDE 10 MG/1
1 TABLET ORAL DAILY
COMMUNITY
Start: 2023-04-07

## 2023-04-18 RX ORDER — DICYCLOMINE HCL 20 MG
20 TABLET ORAL EVERY 6 HOURS
Qty: 120 TABLET | Refills: 5 | Status: SHIPPED | OUTPATIENT
Start: 2023-04-18 | End: 2023-05-18

## 2023-04-18 RX ORDER — CLINDAMYCIN PHOSPHATE 10 MG/G
2 GEL TOPICAL AS NEEDED
COMMUNITY
Start: 2023-04-13 | End: 2024-04-13

## 2023-04-18 RX ORDER — ADALIMUMAB 40MG/0.8ML
40 KIT SUBCUTANEOUS ONCE
COMMUNITY

## 2023-05-06 NOTE — PROGRESS NOTES
Chief Complaint   Patient presents with   • Abdominal Pain     3 month f/u        Subjective    Elijah Crouch is a 47 y.o. female.    History of Present Illness  Patient presented to GI clinic for follow-up visit today.  Feels some better today.  Has intermittent symptoms with abdominal cramping.  Denies nausea, vomiting, diarrhea, constipation, rectal bleeding or weight loss.       The following portions of the patient's history were reviewed and updated as appropriate:   Past Medical History:   Diagnosis Date   • GERD (gastroesophageal reflux disease) 012022   • Migraines    • Wears glasses      Past Surgical History:   Procedure Laterality Date   • ABDOMINAL SURGERY  [id number]    Sleeve   • BARIATRIC SURGERY  [id number]   • BREAST LUMPECTOMY Right 01/23/2019    Procedure: Excision of chronically infected right breast tissue;  Surgeon: Seamus Colón MD;  Location: [facility] OR;  Service: General   • CHOLECYSTECTOMY     • COLONOSCOPY N/A 1/11/2023    Procedure: COLONOSCOPY;  Surgeon: Jakob Hope MD;  Location: [facility] ENDOSCOPY;  Service: Gastroenterology;  Laterality: N/A;   • ENDOSCOPY N/A 1/11/2023    Procedure: ESOPHAGOGASTRODUODENOSCOPY;  Surgeon: Jakob Hope MD;  Location: [facility] ENDOSCOPY;  Service: Gastroenterology;  Laterality: N/A;   • ESSURE TUBAL LIGATION  2010   • GASTRIC SLEEVE LAPAROSCOPIC  01/05/2022   • HYSTERECTOMY     • HYSTEROSCOPY ENDOMETRIAL ABLATION     • INCISION AND DRAINAGE ABSCESS N/A 09/19/2018    Procedure: INCISION AND DRAINAGE OF RIGHT BREAST AND LEFT FLANK  ABSCESSES;  Surgeon: Seamus Colón MD;  Location: [facility] OR;  Service: General   • INCISION AND DRAINAGE ABSCESS Right 11/05/2018    Procedure: INCISION AND DRAINAGE ABSCESS right breast;  Surgeon: Seamus Colón MD;  Location: [facility] OR;  Service: General   • INCISION AND DRAINAGE ABSCESS Right 12/21/2018    Procedure: Incision and Drainage of Right Breast Abscess;  Surgeon: Katarzyna  Seamus Sanford MD;  Location: [facility];  Service: General     Family History   Problem Relation Age of Onset   • Breast cancer Mother      OB History             Para        Term   0            AB        Living           SAB        IAB        Ectopic        Molar        Multiple        Live Births                  Prior to Admission medications    Medication Sig Start Date End Date Taking? Authorizing Provider   cetirizine (zyrTEC) 10 MG tablet Take 1 tablet by mouth Daily. 23  Yes Mariajose Novoa MD   clindamycin (CLINDAGEL) 1 % gel Apply 2 application topically to the appropriate area as directed As Needed. 23 Yes Mariajose Novoa MD   famotidine (PEPCID) 20 MG tablet Take 1 tablet by mouth 2 (Two) Times a Day. 22  Yes Mariajose Novoa MD   fluticasone (FLONASE) 50 MCG/ACT nasal spray 2 sprays into the nostril(s) as directed by provider Daily. 23  Yes Mariajose Novoa MD   omeprazole (priLOSEC) 40 MG capsule Take 1 capsule by mouth Daily. 22  Yes Jakob Hope MD   adalimumab (Humira) 40 MG/0.8ML Prefilled Syringe Kit injection Inject 40 mg under the skin into the appropriate area as directed 1 (One) Time. Weekly  Patient not taking: Reported on 2023    Mariajose Novoa MD   dicyclomine (BENTYL) 20 MG tablet Take 1 tablet by mouth Every 6 (Six) Hours for 30 days. 23  Jakob Hope MD     Allergies   Allergen Reactions   • Hydrocodone-Acetaminophen Nausea And Vomiting     Social History     Socioeconomic History   • Marital status: Single   Tobacco Use   • Smoking status: Former     Packs/day: 0.50     Years: 10.00     Pack years: 5.00     Types: Cigarettes     Quit date: 2021     Years since quittin.3   • Smokeless tobacco: Never   Vaping Use   • Vaping Use: Never used   Substance and Sexual Activity   • Alcohol use: No   • Drug use: No   • Sexual activity: Not Currently     Partners: Male     Birth  "control/protection: Essure       Review of Systems  Review of Systems   Constitutional: Negative for chills, fatigue, fever and unexpected weight change.   HENT: Negative for congestion, ear discharge, hearing loss, nosebleeds and sore throat.    Eyes: Negative for pain, discharge and redness.   Respiratory: Negative for cough, chest tightness, shortness of breath and wheezing.    Cardiovascular: Negative for chest pain and palpitations.   Gastrointestinal: Positive for abdominal pain. Negative for abdominal distention, blood in stool, constipation, diarrhea, nausea and vomiting.   Endocrine: Negative for cold intolerance, polydipsia, polyphagia and polyuria.   Genitourinary: Negative for dysuria, flank pain, frequency, hematuria and urgency.   Musculoskeletal: Negative for arthralgias, back pain, joint swelling and myalgias.   Skin: Negative for color change, pallor and rash.   Neurological: Negative for tremors, seizures, syncope, weakness and headaches.   Hematological: Negative for adenopathy. Does not bruise/bleed easily.   Psychiatric/Behavioral: Negative for behavioral problems, confusion, dysphoric mood, hallucinations and suicidal ideas. The patient is not nervous/anxious.         /80 (BP Location: Right arm)   Pulse 54   Ht 175.3 cm (69\")   Wt 82.6 kg (182 lb)   BMI 26.88 kg/m²     Objective    Physical Exam  Constitutional:       Appearance: She is well-developed.   HENT:      Head: Normocephalic and atraumatic.   Eyes:      Conjunctiva/sclera: Conjunctivae normal.      Pupils: Pupils are equal, round, and reactive to light.   Neck:      Thyroid: No thyromegaly.   Cardiovascular:      Rate and Rhythm: Normal rate and regular rhythm.      Heart sounds: Normal heart sounds. No murmur heard.  Pulmonary:      Effort: Pulmonary effort is normal.      Breath sounds: Normal breath sounds. No wheezing.   Abdominal:      General: Bowel sounds are normal. There is no distension.      Palpations: Abdomen " is soft. There is no mass.      Tenderness: There is no abdominal tenderness.      Hernia: No hernia is present.   Genitourinary:     Comments: No lesions noted  Musculoskeletal:         General: No tenderness. Normal range of motion.      Cervical back: Normal range of motion and neck supple.   Lymphadenopathy:      Cervical: No cervical adenopathy.   Skin:     General: Skin is warm and dry.      Findings: No rash.   Neurological:      Mental Status: She is alert and oriented to person, place, and time.      Cranial Nerves: No cranial nerve deficit.   Psychiatric:         Thought Content: Thought content normal.       Admission on 2023, Discharged on 2023   Component Date Value Ref Range Status   • Reference Lab Report 2023    Final                    Value:[facility]  [address] [place] [address]  Phone: [phone] or [phone]  Fax: [phone]  Qamar Roberts M.D., Medical Director    PATIENT NAME                           LABORATORY NO.  [address]  LIBERTY MANZO.                  [id number]  [id number]                         AGE              SEX  SSN           CLIENT REF #  [facility]           47      1975  F    [id number]   [id number]    [place]                       REQUESTING M.D.     ATTENDING M.D.     COPY TO[address]  DATE COLLECTED      DATE RECEIVED      DATE REPORTED  2023    DIAGNOSIS:  A.   DUODENUM, BIOPSY:  Benign duodenum, negative for significant architectural distortion,  inflammatory infiltrate, neoplasia, malignancy    B.   GASTRIC ANTRUM, BIOPSY:  Reactive (chemical)                           gastropathy  Negative for intestinal metaplasia, neoplasia, malignancy  No Helicobacter-like organisms identified on H and E    C.   GE JUNCTION:  Fragments  "of tissue consistent with gastroesophageal junction  Negative for significant architectural distortion, inflammatory infiltrate,  neoplasia, malignancy  Negative for significant eosinophilia (less than 1 per HPF)  Negative for intestinal metaplasia, dysplasia    D.   TERMINAL ILEUM BIOPSY:  Benign small bowel, consistent with terminal ileum  Negative for significant architectural distortion, inflammatory infiltrate,  neoplasia, malignancy    E.   COLON, BIOPSY:  Benign colonic mucosa, negative for significant architectural distortion,  inflammatory infiltrate, neoplasia, malignancy    CLINICAL HISTORY:  Generalized abdominal pain, nausea, diarrhea, unspecified type    SPECIMENS RECEIVED:  A.  DUODENUM, BIOPSY  B.  GASTRIC ANTRUM, BIOPSY  C.  GE JUNCTION  D.  TERMINAL ILEUM BIOPSY  E.  COLON, BIOPSY    MICROSCOPIC                           DESCRIPTION:  Tissue blocks are prepared and slides are examined microscopically on all  specimens. See diagnosis for details.    Professional interpretation rendered by Dee Alcaraz M.D., F.C.A.P. at  [facility], [address] [place] [address].    GROSS DESCRIPTION:  A.  Specimen is received in 1 formalin filled container labeled \"duodenum\"  and consists of 2 portions of tan soft tissue measuring 0.7 x 0.5 x 0.2 cm in  aggregate.  Submitted entirely in 1 cassette.  MTH  B.  Specimen is received in 1 formalin filled container labeled \"gastric,  antrum\" and consists of 2 portions of tan soft tissue measuring 0.6 x 0.6 x  0.2 cm in aggregate.  Submitted entirely in 1 cassette.  C.  Specimen is received in 1 formalin filled container labeled \"EGJ\" and  consists of multiple portions of gray soft tissue measuring 0.8 x 0.5 x 0.1  cm in aggregate.  Submitted entirely in 1 cassette.  D.  Specimen is received in 1 formalin filled container labeled \"TI\" and  consists of 2                           portions of tan soft tissue measuring 0.4 x 0.3 x 0.2 cm in  aggregate.  " "Submitted entirely in 1 cassette.  E.  Specimen is received in 1 formalin filled container labeled \"colonic  mucosa\" and consists of 3 portions of tan soft tissue measuring 0.5 x 0.4 x  0.2 cm in aggregate.  Submitted entirely in 1 cassette.    REVIEWED, DIAGNOSED AND ELECTRONICALLY  SIGNED BY:    Dee Alcaraz M.D., F.C.A.P.  CPT CODES:  88305x5       Assessment & Plan    No diagnosis found..   1.  Abdominal pain, likely due to IBS.  Add Bentyl as needed.  Add probiotics.  2. Constipation, well controlled.  Continue MiraLAX and high-fiber diet.  3.  Abdominal pain with nausea and weight loss, improving.  Likely due to reactive gastropathy.  Continue PPI and Pepcid.  4.  High BMI, recommend exercise and diet control.    Orders placed during this encounter include:  No orders of the defined types were placed in this encounter.      * Surgery not found *    Review and/or summary of lab tests, radiology, procedures, medications. Review and summary of old records and obtaining of history. The risks and benefits of my recommendations, as well as other treatment options were discussed with the patient today. Questions were answered.    New Medications Ordered This Visit   Medications   • dicyclomine (BENTYL) 20 MG tablet     Sig: Take 1 tablet by mouth Every 6 (Six) Hours for 30 days.     Dispense:  120 tablet     Refill:  5       Follow-up: Return in about 1 month (around 5/18/2023).               Results for orders placed or performed during the hospital encounter of 01/11/23   TISSUE EXAM, [facility] ([facility])    Specimen: A: Small Intestine, Duodenum; Tissue    B: Gastric, Antrum; Tissue    C: Esophagus; Tissue    D: Small Intestine, Ileum; Tissue    E: Large Intestine; Tissue   Result Value Ref Range    Reference Lab Report       [facility]  [address] [place] [address]  Phone: [phone] or [phone]  Fax: [phone]  Qamar Roberts M.D., Medical " Director    PATIENT NAME                           LABORATORY NO.  [address]  LIBERTY MANZO.                  [id number]  [id number]                         AGE              SEX  N           CLIENT REF #  [place] [id number]      1975  F    [id number]   [id number]    [place]                       REQUESTING M.D.     ATTENDING M.D.     COPY TO.  [address] [place] [address]                 JAIME LUCIO EMMA  [employer] [id number] [place] [address]  DATE COLLECTED      DATE RECEIVED      DATE REPORTED  2023    DIAGNOSIS:  A.   DUODENUM, BIOPSY:  Benign duodenum, negative for significant architectural distortion,  inflammatory infiltrate, neoplasia, malignancy    B.   GASTRIC ANTRUM, BIOPSY:  Reactive (chemical)  gastropathy  Negative for intestinal metaplasia, neoplasia, malignancy  No Helicobacter-like organisms identified on H and E    C.   GE JUNCTION:  Fragments of tissue consistent with gastroesophageal junction  Negative for significant architectural distortion, inflammatory infiltrate,  neoplasia, malignancy  Negative for significant eosinophilia (less than 1 per HPF)  Negative for intestinal metaplasia, dysplasia    D.   TERMINAL ILEUM BIOPSY:  Benign small bowel, consistent with terminal ileum  Negative for significant architectural distortion, inflammatory infiltrate,  neoplasia, malignancy    E.   COLON, BIOPSY:  Benign colonic mucosa, negative for significant architectural distortion,  inflammatory infiltrate, neoplasia, malignancy    CLINICAL HISTORY:  Generalized abdominal pain, nausea, diarrhea, unspecified type    SPECIMENS RECEIVED:  A.  DUODENUM, BIOPSY  B.  GASTRIC ANTRUM, BIOPSY  C.  GE JUNCTION  D.  TERMINAL ILEUM BIOPSY  E.  COLON, BIOPSY    MICROSCOPIC  DESCRIPTION:  Tissue blocks are prepared and slides are examined microscopically on all  specimens. See diagnosis for details.    Professional  "interpretation rendered by Dee Alcaraz M.D., COCOPAnnemarie at  [facility], [facility], [address], [place] [address].    GROSS DESCRIPTION:  A.  Specimen is received in 1 formalin filled container labeled \"duodenum\"  and consists of 2 portions of tan soft tissue measuring 0.7 x 0.5 x 0.2 cm in  aggregate.  Submitted entirely in 1 cassette.  MTH  B.  Specimen is received in 1 formalin filled container labeled \"gastric,  antrum\" and consists of 2 portions of tan soft tissue measuring 0.6 x 0.6 x  0.2 cm in aggregate.  Submitted entirely in 1 cassette.  C.  Specimen is received in 1 formalin filled container labeled \"EGJ\" and  consists of multiple portions of gray soft tissue measuring 0.8 x 0.5 x 0.1  cm in aggregate.  Submitted entirely in 1 cassette.  D.  Specimen is received in 1 formalin filled container labeled \"TI\" and  consists of 2  portions of tan soft tissue measuring 0.4 x 0.3 x 0.2 cm in  aggregate.  Submitted entirely in 1 cassette.  E.  Specimen is received in 1 formalin filled container labeled \"colonic  mucosa\" and consists of 3 portions of tan soft tissue measuring 0.5 x 0.4 x  0.2 cm in aggregate.  Submitted entirely in 1 cassette.    REVIEWED, DIAGNOSED AND ELECTRONICALLY  SIGNED BY:    Dee Alcaraz M.D., F.C.A.P.  CPT CODES:  88305x5     Results for orders placed or performed during the hospital encounter of 02/26/22   Green Top (Gel)   Result Value Ref Range    Extra Tube Hold for add-ons.    CBC Auto Differential    Specimen: Blood   Result Value Ref Range    WBC 5.58 3.40 - 10.80 10*3/mm3    RBC 5.32 (H) 3.77 - 5.28 10*6/mm3    Hemoglobin 14.8 12.0 - 15.9 g/dL    Hematocrit 45.6 34.0 - 46.6 %    MCV 85.7 79.0 - 97.0 fL    MCH 27.8 26.6 - 33.0 pg    MCHC 32.5 31.5 - 35.7 g/dL    RDW 13.9 12.3 - 15.4 %    RDW-SD 43.5 37.0 - 54.0 fl    MPV 11.0 6.0 - 12.0 fL    Platelets 202 140 - 450 10*3/mm3    Neutrophil % 51.2 42.7 - 76.0 %    Lymphocyte % 39.6 19.6 - 45.3 %    Monocyte % 6.6 5.0 - 12.0 " %    Eosinophil % 2.0 0.3 - 6.2 %    Basophil % 0.4 0.0 - 1.5 %    Immature Grans % 0.2 0.0 - 0.5 %    Neutrophils, Absolute 2.86 1.70 - 7.00 10*3/mm3    Lymphocytes, Absolute 2.21 0.70 - 3.10 10*3/mm3    Monocytes, Absolute 0.37 0.10 - 0.90 10*3/mm3    Eosinophils, Absolute 0.11 0.00 - 0.40 10*3/mm3    Basophils, Absolute 0.02 0.00 - 0.20 10*3/mm3    Immature Grans, Absolute 0.01 0.00 - 0.05 10*3/mm3    nRBC 0.0 0.0 - 0.2 /100 WBC   Troponin    Specimen: Blood   Result Value Ref Range    Troponin T <0.010 0.000 - 0.030 ng/mL   hCG, Serum, Qualitative    Specimen: Blood   Result Value Ref Range    HCG Qualitative Negative Negative   BNP    Specimen: Blood   Result Value Ref Range    proBNP 26.3 0.0 - 450.0 pg/mL   Magnesium    Specimen: Blood   Result Value Ref Range    Magnesium 2.0 1.6 - 2.6 mg/dL   Lipase    Specimen: Blood   Result Value Ref Range    Lipase 14 13 - 60 U/L   Comprehensive Metabolic Panel    Specimen: Blood   Result Value Ref Range    Glucose 113 (H) 65 - 99 mg/dL    BUN 9 6 - 20 mg/dL    Creatinine 0.89 0.57 - 1.00 mg/dL    Sodium 141 136 - 145 mmol/L    Potassium 3.0 (L) 3.5 - 5.2 mmol/L    Chloride 104 98 - 107 mmol/L    CO2 25.0 22.0 - 29.0 mmol/L    Calcium 9.4 8.6 - 10.5 mg/dL    Total Protein 7.5 6.0 - 8.5 g/dL    Albumin 4.10 3.50 - 5.20 g/dL    ALT (SGPT) 44 (H) 1 - 33 U/L    AST (SGOT) 39 (H) 1 - 32 U/L    Alkaline Phosphatase 60 39 - 117 U/L    Total Bilirubin 0.8 0.0 - 1.2 mg/dL    eGFR  African Amer 83 >60 mL/min/1.73    Globulin 3.4 gm/dL    A/G Ratio 1.2 g/dL    BUN/Creatinine Ratio 10.1 7.0 - 25.0    Anion Gap 12.0 5.0 - 15.0 mmol/L   ECG 12 Lead   Result Value Ref Range    QT Interval 444 ms    QTC Interval 458 ms   Results for orders placed or performed in visit on 10/12/21   Treadmill Stress Test   Result Value Ref Range    Target HR (85%) 149 bpm    Max. Pred. HR (100%) 175 bpm   Results for orders placed or performed in visit on 09/23/21   ECG 12 Lead   Result Value Ref Range     QT Interval 404 ms    QTC Interval 454 ms   Results for orders placed or performed during the hospital encounter of 01/23/19   Tissue Pathology Exam    Specimen: Breast, Right; Tissue   Result Value Ref Range    Case Report       Surgical Pathology Report                         Case: [id number]                                  Authorizing Provider:  Seamus Colón,   Collected:           01/23/2019 09:07 AM                                 MD                                                                           Ordering Location:     [facility]             Received:            01/23/2019 10:10 AM                                 [place]                                                              Pathologist:           Anthony Giron MD                                                           Specimen:    Breast, Right, right breast tissue                                                         Final Diagnosis       SKIN AND SUBCUTIS, RIGHT BREAST, DEBRIDEMENT:  ACUTE AND CHRONIC INFLAMMATION WITH FIBROSIS, CONSISTENT WITH CHRONIC ABSCESS.      Gross Description       The specimen consists of a strip of skin measuring 1.4 x 0.3 cm in surface area and 0.7 cm in thickness.  Another fragment of fibrofatty tissue measures 1.3 x 1.0 x 0.6 cm.  The latter specimen is bisected, and both are submitted in one block.     Results for orders placed or performed during the hospital encounter of 12/21/18   Wound Culture - Wound, Breast, Right    Specimen: Breast, Right; Wound   Result Value Ref Range    Wound Culture Heavy growth (4+) Staphylococcus lugdunensis (A)     Gram Stain Many (4+) WBCs seen     Gram Stain Many (4+) Gram positive cocci in clusters     Gram Stain         Susceptibility    Staphylococcus lugdunensis - RIMA     Clindamycin  Resistant ug/ml     Levofloxacin*  Susceptible ug/ml      * Staphylococcus species may develop resistance during prolonged therapy with quinolones.  Isolates  "that are initially susceptible may become resistant within three to four days after initiation of therapy. Testing of repeat isolates may be warranted.     Oxacillin  Susceptible ug/ml     Tetracycline  Susceptible ug/ml     Trimethoprim + Sulfamethoxazole  Susceptible ug/ml     Vancomycin  Susceptible ug/ml   Results for orders placed or performed during the hospital encounter of 11/05/18   Tissue Pathology Exam    Specimen: Breast, Right; Tissue   Result Value Ref Range    Case Report       Surgical Pathology Report                         Case: [id number]                                  Authorizing Provider:  Seamus Colón,   Collected:           11/05/2018 03:37 PM                                 MD                                                                           Ordering Location:     [facility]             Received:            11/06/2018 07:56 AM                                 [facility]                                                              Pathologist:           Qamar Tam MD                                                         Specimen:    Breast, Right                                                                              Final Diagnosis       ABSCESS, RIGHT BREAST:  ACTIVE CHRONIC ABSCESS AND CICATRICIAL FIBROSIS.  NEGATIVE FOR NEOPLASM.      Gross Description       The container is labeled \"right breast\" and has a discoid segment of yellowish-gray tissue measuring 2.0 x 1.4 x 0.7 cm.  The outer surface is now marked with ink.  The entire specimen is embedded as 1A.     Results for orders placed or performed during the hospital encounter of 09/19/18   Tissue Pathology Exam    Specimen: Breast, Right; Tissue   Result Value Ref Range    Case Report       Surgical Pathology Report                         Case: [id number]                                  Authorizing Provider:  Seamus Colnó,   Collected:           09/19/2018 02:02 PM                "                  MD                                                                           Ordering Location:     [facility]             Received:            09/20/2018 07:59 AM                                 [place]                                                              Pathologist:           Anthony Giron MD                                                           Specimen:    Breast, Right                                                                              Final Diagnosis       SKIN, RIGHT BREAST:  ABSCESS.      Gross Description       The specimen consists of a fusiform fragment of skin and subcutis measuring 1.9 x 0.7 cm in surface area and 0.9 cm in thickness.  The specimen is serially sectioned and entirely submitted in 1 block after the margins are marked with ink.      Special Stains       I ordered silver stain to evaluate fungal infection as a cause for the abscess.    Silver stain is negative for fungal organisms.     Results for orders placed or performed in visit on 12/15/14   Progesterone    Specimen: Blood   Result Value Ref Range    Progesterone 12.7 ng/mL   CBC and Differential    Specimen: Blood   Result Value Ref Range    WBC 8.1 3.2 - 9.8 x1000/uL    RBC 5.01 3.77 - 5.16 margarita/mm3    Hemoglobin 13.0 12.0 - 15.5 gm/dl    Hematocrit 41.3 35.0 - 45.0 %    MCV 82.4 80.0 - 98.0 fl    MCH 25.9 (L) 26.0 - 34.0 pg    MCHC 31.5 31.4 - 36.0 gm/dl    RDW 15.2 (H) 11.5 - 14.5 %    Platelets 264 150 - 450 x1000/mm3    MPV 11.1 8.0 - 12.0 fl    Neutrophil Rel % 65.2 37.0 - 80.0 %    Lymphocyte Rel % 26.9 10.0 - 50.0 %    Monocyte Rel % 5.4 0.0 - 12.0 %    Eosinophil Rel % 2.1 0.0 - 7.0 %    Basophil Rel % 0.2 0.0 - 2.0 %    Immature Granulocyte Rel % 0.20 0.00 - 0.50 %    Neutrophils Absolute 5.26 2.00 - 8.60 x1000/uL    Lymphocytes Absolute 2.17 0.60 - 4.20 x1000/uL    Monocytes Absolute 0.44 0.00 - 0.90 x1000/uL    Eosinophils Absolute 0.17 0.00 - 0.70 x1000/uL    Basophils  Absolute 0.02 0.00 - 0.20 x1000/uL    Immature Granulocytes Absolute 0.020 0.005 - 0.022 x1000/uL   TSH    Specimen: Blood   Result Value Ref Range    TSH 1.45 0.46 - 4.68 uIU/ml   T4, free    Specimen: Blood   Result Value Ref Range    Free T4 0.86 0.78 - 2.19 ng/dl   Luteinizing hormone    Specimen: Blood   Result Value Ref Range    LH 6.6 mIU/mL   Follicle stimulating hormone    Specimen: Blood   Result Value Ref Range    FSH 3.4 mIU/mL   Results for orders placed or performed in visit on 12/01/08   Converted (Historical) Gyn Cytology    Specimen: Other   Result Value Ref Range    Spec Descr 1 SPECIMEN(S): Cervical/Endocervical     Clinical Information       CLINICAL HISTORY: Reason for Pap Smear: Routine Smear, LMP: 11/10/08    Specimen Adequacy         SPECIMEN ADEQUACY:  Satisfactory for evaluation:  endocervical/transformation zone component  present.      Microorganisms         MICROORGANISMS:  Fungal organisms morphologically consistent with Candida species.      Final Diagnosis         INTERPRETATION:  Negative for intraepithelial lesion or malignancy.      CONVERTED (HISTORICAL) FINAL PATHOLOGIST       Diagnostician:  SABINO DELACRUZ(ASCP)  Cytotechnologist  Electronically Signed 12/04/2008      ICD9 Diagnosis Code 1 ICD-9: V72.31     DISCLAIMER       The Pap smear is a screening test that aids in the detection of cervical  cancer and cancer precursors.  Both false positive and false negative  results can occur.  The test should be used at regular intervals, and positive results  should be confirmed before definitive therapy.       *Note: Due to a large number of results and/or encounters for the requested time period, some results have not been displayed. A complete set of results can be found in Results Review.         This document has been electronically signed by Jakob Hope MD on May 6, 2023 08:27 CDT

## 2023-05-10 ENCOUNTER — HOSPITAL ENCOUNTER (EMERGENCY)
Facility: HOSPITAL | Age: 48
Discharge: HOME | End: 2023-05-11
Attending: STUDENT IN AN ORGANIZED HEALTH CARE EDUCATION/TRAINING PROGRAM
Payer: COMMERCIAL

## 2023-05-10 DIAGNOSIS — G50.0 TRIGEMINAL NEURALGIA OF RIGHT SIDE OF FACE: Primary | ICD-10-CM

## 2023-05-10 DIAGNOSIS — R00.1 BRADYCARDIA: ICD-10-CM

## 2023-05-10 PROCEDURE — 99284 EMERGENCY DEPT VISIT MOD MDM: CPT

## 2023-05-10 NOTE — Clinical Note
[facility] EMERGENCY DEPARTMENT  [address] [place] [id number]  Phone: [phone]    Elijah Crouch was seen and treated in our emergency department on 5/10/2023.  She may return to work on 05/15/2023.         Thank you for choosing [facility].    Mery Bernabe MD

## 2023-05-11 VITALS
DIASTOLIC BLOOD PRESSURE: 78 MMHG | HEIGHT: 68 IN | BODY MASS INDEX: 27.28 KG/M2 | RESPIRATION RATE: 18 BRPM | HEART RATE: 50 BPM | WEIGHT: 180 LBS | SYSTOLIC BLOOD PRESSURE: 162 MMHG | OXYGEN SATURATION: 100 % | TEMPERATURE: 98.4 F

## 2023-05-11 LAB
ALBUMIN SERPL-MCNC: 4 G/DL (ref 3.5–5.2)
ALBUMIN/GLOB SERPL: 1.3 G/DL
ALP SERPL-CCNC: 47 U/L (ref 39–117)
ALT SERPL W P-5'-P-CCNC: 21 U/L (ref 1–33)
ANION GAP SERPL CALCULATED.3IONS-SCNC: 12 MMOL/L (ref 5–15)
AST SERPL-CCNC: 21 U/L (ref 1–32)
BASOPHILS # BLD AUTO: 0.01 10*3/MM3 (ref 0–0.2)
BASOPHILS NFR BLD AUTO: 0.1 % (ref 0–1.5)
BILIRUB SERPL-MCNC: 0.3 MG/DL (ref 0–1.2)
BUN SERPL-MCNC: 16 MG/DL (ref 6–20)
BUN/CREAT SERPL: 26.7 (ref 7–25)
CALCIUM SPEC-SCNC: 9.2 MG/DL (ref 8.6–10.5)
CHLORIDE SERPL-SCNC: 107 MMOL/L (ref 98–107)
CO2 SERPL-SCNC: 24 MMOL/L (ref 22–29)
CREAT SERPL-MCNC: 0.6 MG/DL (ref 0.57–1)
DEPRECATED RDW RBC AUTO: 40.6 FL (ref 37–54)
EGFRCR SERPLBLD CKD-EPI 2021: 112.3 ML/MIN/1.73
EOSINOPHIL # BLD AUTO: 0.05 10*3/MM3 (ref 0–0.4)
EOSINOPHIL NFR BLD AUTO: 0.7 % (ref 0.3–6.2)
ERYTHROCYTE [DISTWIDTH] IN BLOOD BY AUTOMATED COUNT: 12.9 % (ref 12.3–15.4)
GLOBULIN UR ELPH-MCNC: 3.2 GM/DL
GLUCOSE SERPL-MCNC: 85 MG/DL (ref 65–99)
HCT VFR BLD AUTO: 40.4 % (ref 34–46.6)
HGB BLD-MCNC: 12.9 G/DL (ref 12–15.9)
HOLD SPECIMEN: NORMAL
IMM GRANULOCYTES # BLD AUTO: 0.02 10*3/MM3 (ref 0–0.05)
IMM GRANULOCYTES NFR BLD AUTO: 0.3 % (ref 0–0.5)
LYMPHOCYTES # BLD AUTO: 4.04 10*3/MM3 (ref 0.7–3.1)
LYMPHOCYTES NFR BLD AUTO: 57.1 % (ref 19.6–45.3)
MCH RBC QN AUTO: 27.8 PG (ref 26.6–33)
MCHC RBC AUTO-ENTMCNC: 31.9 G/DL (ref 31.5–35.7)
MCV RBC AUTO: 87.1 FL (ref 79–97)
MONOCYTES # BLD AUTO: 0.38 10*3/MM3 (ref 0.1–0.9)
MONOCYTES NFR BLD AUTO: 5.4 % (ref 5–12)
NEUTROPHILS NFR BLD AUTO: 2.58 10*3/MM3 (ref 1.7–7)
NEUTROPHILS NFR BLD AUTO: 36.4 % (ref 42.7–76)
NRBC BLD AUTO-RTO: 0 /100 WBC (ref 0–0.2)
PLATELET # BLD AUTO: 207 10*3/MM3 (ref 140–450)
PMV BLD AUTO: 9.9 FL (ref 6–12)
POTASSIUM SERPL-SCNC: 3.5 MMOL/L (ref 3.5–5.2)
PROT SERPL-MCNC: 7.2 G/DL (ref 6–8.5)
QT INTERVAL: 490 MS
QTC INTERVAL: 437 MS
RBC # BLD AUTO: 4.64 10*6/MM3 (ref 3.77–5.28)
SODIUM SERPL-SCNC: 143 MMOL/L (ref 136–145)
WBC NRBC COR # BLD: 7.08 10*3/MM3 (ref 3.4–10.8)
WHOLE BLOOD HOLD COAG: NORMAL

## 2023-05-11 PROCEDURE — 93005 ELECTROCARDIOGRAM TRACING: CPT

## 2023-05-11 PROCEDURE — 80053 COMPREHEN METABOLIC PANEL: CPT | Performed by: STUDENT IN AN ORGANIZED HEALTH CARE EDUCATION/TRAINING PROGRAM

## 2023-05-11 PROCEDURE — 85025 COMPLETE CBC W/AUTO DIFF WBC: CPT | Performed by: STUDENT IN AN ORGANIZED HEALTH CARE EDUCATION/TRAINING PROGRAM

## 2023-05-11 PROCEDURE — 93005 ELECTROCARDIOGRAM TRACING: CPT | Performed by: STUDENT IN AN ORGANIZED HEALTH CARE EDUCATION/TRAINING PROGRAM

## 2023-05-11 RX ORDER — CARBAMAZEPINE 200 MG/1
200 TABLET, EXTENDED RELEASE ORAL 2 TIMES DAILY
Qty: 28 TABLET | Refills: 0 | Status: SHIPPED | OUTPATIENT
Start: 2023-05-11 | End: 2023-05-25

## 2023-05-11 RX ORDER — CARBAMAZEPINE 200 MG/1
200 TABLET ORAL ONCE
Status: COMPLETED | OUTPATIENT
Start: 2023-05-11 | End: 2023-05-11

## 2023-05-11 RX ADMIN — CARBAMAZEPINE 200 MG: 200 TABLET ORAL at 01:24

## 2023-05-11 NOTE — ED PROVIDER NOTES
Subjective   History of Present Illness  Patient presents with right-sided face pain from jaw to ear to under her eye to her forehead.  Pain is worse that her jaw and under her eye and ear.  Patient has gone to the dentist and had a tooth fixed on Saturday but does not help.  This pain has been going on for about a month but worse today.  Patient states the pain is stabbing and feels like electrical shocks.  She has had no previous episode.  Patient also bradycardic down to the upper 40s.        Review of Systems   Constitutional: Negative for activity change and appetite change.   HENT: Negative for congestion and ear pain.         Right face pain   Eyes: Negative for pain and discharge.   Respiratory: Negative for chest tightness and shortness of breath.    Cardiovascular: Negative for chest pain and palpitations.   Gastrointestinal: Negative for abdominal distention and abdominal pain.   Endocrine: Negative for cold intolerance and heat intolerance.   Genitourinary: Negative for difficulty urinating and dysuria.   Musculoskeletal: Negative for arthralgias and back pain.   Skin: Negative for color change and rash.   Allergic/Immunologic: Negative for environmental allergies and food allergies.   Neurological: Negative for dizziness and headaches.   Hematological: Negative for adenopathy. Does not bruise/bleed easily.   Psychiatric/Behavioral: Negative for agitation and confusion.       Past Medical History:   Diagnosis Date   • GERD (gastroesophageal reflux disease) 012022   • Migraines    • Wears glasses        Allergies   Allergen Reactions   • Hydrocodone-Acetaminophen Nausea And Vomiting       Past Surgical History:   Procedure Laterality Date   • ABDOMINAL SURGERY  01052022    Sleeve   • BARIATRIC SURGERY  01052022   • BREAST LUMPECTOMY Right 01/23/2019    Procedure: Excision of chronically infected right breast tissue;  Surgeon: Seamus Colón MD;  Location: [facility];  Service: General   •  CHOLECYSTECTOMY     • COLONOSCOPY N/A 2023    Procedure: COLONOSCOPY;  Surgeon: Jakob Hope MD;  Location: [facility] ENDOSCOPY;  Service: Gastroenterology;  Laterality: N/A;   • ENDOSCOPY N/A 2023    Procedure: ESOPHAGOGASTRODUODENOSCOPY;  Surgeon: Jakob Hope MD;  Location: [facility] ENDOSCOPY;  Service: Gastroenterology;  Laterality: N/A;   • ESSURE TUBAL LIGATION     • GASTRIC SLEEVE LAPAROSCOPIC  2022   • HYSTERECTOMY     • HYSTEROSCOPY ENDOMETRIAL ABLATION     • INCISION AND DRAINAGE ABSCESS N/A 2018    Procedure: INCISION AND DRAINAGE OF RIGHT BREAST AND LEFT FLANK  ABSCESSES;  Surgeon: Seamus Colón MD;  Location: [facility] OR;  Service: General   • INCISION AND DRAINAGE ABSCESS Right 2018    Procedure: INCISION AND DRAINAGE ABSCESS right breast;  Surgeon: Seamus Colón MD;  Location: [facility] OR;  Service: General   • INCISION AND DRAINAGE ABSCESS Right 2018    Procedure: Incision and Drainage of Right Breast Abscess;  Surgeon: Seamus Colón MD;  Location: [facility] OR;  Service: General       Family History   Problem Relation Age of Onset   • Breast cancer Mother        Social History     Socioeconomic History   • Marital status: Single   Tobacco Use   • Smoking status: Former     Packs/day: 0.50     Years: 10.00     Pack years: 5.00     Types: Cigarettes     Quit date: 2021     Years since quittin.4   • Smokeless tobacco: Never   Vaping Use   • Vaping Use: Never used   Substance and Sexual Activity   • Alcohol use: No   • Drug use: No   • Sexual activity: Not Currently     Partners: Male     Birth control/protection: Essure           Objective   Physical Exam  Vitals and nursing note reviewed.   Constitutional:       Appearance: She is well-developed.   HENT:      Head: Normocephalic and atraumatic.      Comments: Tapping on right trigeminal nerve area anterior to ear exacerbates pain. No dental or oral lesions.   Eyes:       Pupils: Pupils are equal, round, and reactive to light.   Neck:      Thyroid: No thyromegaly.      Vascular: No JVD.      Trachea: No tracheal deviation.   Cardiovascular:      Rate and Rhythm: Normal rate.      Pulses:           Radial pulses are 2+ on the right side and 2+ on the left side.        Dorsalis pedis pulses are 2+ on the right side and 2+ on the left side.      Heart sounds: Normal heart sounds, S1 normal and S2 normal.   Pulmonary:      Effort: Pulmonary effort is normal.      Breath sounds: Normal breath sounds.   Abdominal:      General: Bowel sounds are normal.   Musculoskeletal:         General: Normal range of motion.   Skin:     General: Skin is warm and dry.      Capillary Refill: Capillary refill takes 2 to 3 seconds.   Neurological:      Mental Status: She is alert and oriented to person, place, and time.      GCS: GCS eye subscore is 4. GCS verbal subscore is 5. GCS motor subscore is 6.   Psychiatric:         Speech: Speech normal.         Behavior: Behavior normal.         Thought Content: Thought content normal.         ECG 12 Lead      Date/Time: 5/11/2023 2:09 AM  Performed by: Mery Bernabe MD  Authorized by: Mery Bernabe MD   Interpreted by physician  Comparison: compared with previous ECG from 2/26/2022  Similar to previous ECG  Rhythm: sinus bradycardia  Comments: Sinus bradycardia ventricular rate 48 bpm, QRS 82 ms, QTc 437                 ED Course           Vitals:    05/11/23 0201 05/11/23 0209 05/11/23 0215 05/11/23 0216   BP: 162/78      BP Location:       Patient Position:       Pulse: 52 50 54 50   Resp:    18   Temp:       TempSrc:       SpO2: 98% 99% 98% 100%   Weight:       Height:          Lab Results (last 24 hours)     Procedure Component Value Units Date/Time    Comprehensive Metabolic Panel [[id number]]  (Abnormal) Collected: 05/11/23 0124    Specimen: Blood Updated: 05/11/23 0152     Glucose 85 mg/dL      BUN 16 mg/dL      Creatinine 0.60 mg/dL       Sodium 143 mmol/L      Potassium 3.5 mmol/L      Chloride 107 mmol/L      CO2 24.0 mmol/L      Calcium 9.2 mg/dL      Total Protein 7.2 g/dL      Albumin 4.0 g/dL      ALT (SGPT) 21 U/L      AST (SGOT) 21 U/L      Alkaline Phosphatase 47 U/L      Total Bilirubin 0.3 mg/dL      Globulin 3.2 gm/dL      A/G Ratio 1.3 g/dL      BUN/Creatinine Ratio 26.7     Anion Gap 12.0 mmol/L      eGFR 112.3 mL/min/1.73     Narrative:      GFR Normal >60  Chronic Kidney Disease <60  Kidney Failure <15      CBC & Differential [[id number]]  (Abnormal) Collected: 05/11/23 0124    Specimen: Blood Updated: 05/11/23 0142    Narrative:      The following orders were created for panel order CBC & Differential.  Procedure                               Abnormality         Status                     ---------                               -----------         ------                     CBC Auto Differential[[id number]]        Abnormal            Final result               Scan Slide[[id number]]                                                                    Please view results for these tests on the individual orders.    CBC Auto Differential [[id number]]  (Abnormal) Collected: 05/11/23 0124    Specimen: Blood Updated: 05/11/23 0142     WBC 7.08 10*3/mm3      RBC 4.64 10*6/mm3      Hemoglobin 12.9 g/dL      Hematocrit 40.4 %      MCV 87.1 fL      MCH 27.8 pg      MCHC 31.9 g/dL      RDW 12.9 %      RDW-SD 40.6 fl      MPV 9.9 fL      Platelets 207 10*3/mm3      Neutrophil % 36.4 %      Lymphocyte % 57.1 %      Monocyte % 5.4 %      Eosinophil % 0.7 %      Basophil % 0.1 %      Immature Grans % 0.3 %      Neutrophils, Absolute 2.58 10*3/mm3      Lymphocytes, Absolute 4.04 10*3/mm3      Monocytes, Absolute 0.38 10*3/mm3      Eosinophils, Absolute 0.05 10*3/mm3      Basophils, Absolute 0.01 10*3/mm3      Immature Grans, Absolute 0.02 10*3/mm3      nRBC 0.0 /100 WBC          No radiology results for the last day                                    Medical Decision Making  Patient with physical exam findings consistent with trigeminal neuralgia.  Pain was replicated with tapping on the bifurcation of the trigeminal nerve.  Tegretol was initiated with prescription for same.  EKG showed bradycardia with blood pressure within normal limits.  Electrolytes unremarkable on labs.  Patient discharged home with close follow-up with PCP.    Bradycardia: acute illness or injury  Trigeminal neuralgia of right side of face: acute illness or injury  Amount and/or Complexity of Data Reviewed  Labs: ordered.  ECG/medicine tests: ordered and independent interpretation performed.      Risk  Prescription drug management.          Final diagnoses:   Trigeminal neuralgia of right side of face   Bradycardia       ED Disposition  ED Disposition     ED Disposition   Discharge    Condition   Stable    Comment   --             Doretha Howard, APRN  [address] [facility] [address]  [phone]    Call in 1 day  for follow up         Medication List      New Prescriptions    carBAMazepine  MG 12 hr tablet  Commonly known as: TEGretol-XR  Take 1 tablet by mouth 2 (Two) Times a Day for 14 days.           Where to Get Your Medications      These medications were sent to [facility]  [address] [place] [address]    Hours: Monday through Friday 7:00am to 5:00pm Phone: [phone]   · carBAMazepine  MG 12 hr tablet       This document has been electronically signed by Mery Bernabe MD on May 11, 2023 04:47 CDT    Mery Bernabe MD   Part of this note may be an electronic transcription/translation of spoken language to printed text using the Dragon Dictation System.        Mery Bernabe MD  05/11/23 0447

## 2023-05-11 NOTE — DISCHARGE INSTRUCTIONS
Start taking Tegretol twice a day tomorrow.  Call your primary care tomorrow for follow-up.  You may need to increase your dose of Tegretol to help with your pain.  Use Tylenol 650 mg every 6 hours or 1 g every 8 hours to see if this helps with your pain.  Return to ED as needed.

## 2023-05-22 LAB
QT INTERVAL: 490 MS
QTC INTERVAL: 437 MS

## 2023-06-01 ENCOUNTER — OFFICE VISIT (OUTPATIENT)
Dept: GASTROENTEROLOGY | Facility: CLINIC | Age: 48
End: 2023-06-01
Payer: COMMERCIAL

## 2023-06-01 VITALS
SYSTOLIC BLOOD PRESSURE: 118 MMHG | WEIGHT: 183 LBS | HEIGHT: 68 IN | DIASTOLIC BLOOD PRESSURE: 77 MMHG | BODY MASS INDEX: 27.74 KG/M2

## 2023-06-01 DIAGNOSIS — R10.84 GENERALIZED ABDOMINAL PAIN: Primary | ICD-10-CM

## 2023-06-01 PROCEDURE — 99213 OFFICE O/P EST LOW 20 MIN: CPT | Performed by: INTERNAL MEDICINE

## 2023-06-01 PROCEDURE — 1159F MED LIST DOCD IN RCRD: CPT | Performed by: INTERNAL MEDICINE

## 2023-06-01 PROCEDURE — 1160F RVW MEDS BY RX/DR IN RCRD: CPT | Performed by: INTERNAL MEDICINE

## 2023-06-01 RX ORDER — NYSTATIN 100000 U/G
1 CREAM TOPICAL 2 TIMES DAILY
COMMUNITY
Start: 2023-05-15

## 2023-06-19 NOTE — PROGRESS NOTES
Chief Complaint   Patient presents with   • Abdominal Pain     1 Month Follow Up       Subjective    Elijah Crouch is a 47 y.o. female.    History of Present Illness  Patient presented to GI clinic for follow-up visit today.  Feels better currently.  Abdominal pain has improved.  Denies nausea or vomiting.  Bowel movements regular.  Weight is stable.       The following portions of the patient's history were reviewed and updated as appropriate:   Past Medical History:   Diagnosis Date   • GERD (gastroesophageal reflux disease) 012022   • Migraines    • Wears glasses      Past Surgical History:   Procedure Laterality Date   • ABDOMINAL SURGERY  01052022    Sleeve   • BARIATRIC SURGERY  01052022   • BREAST LUMPECTOMY Right 01/23/2019    Procedure: Excision of chronically infected right breast tissue;  Surgeon: Seamus Colón MD;  Location: [facility] OR;  Service: General   • CHOLECYSTECTOMY     • COLONOSCOPY N/A 1/11/2023    Procedure: COLONOSCOPY;  Surgeon: Jakob Hope MD;  Location: [facility] ENDOSCOPY;  Service: Gastroenterology;  Laterality: N/A;   • ENDOSCOPY N/A 1/11/2023    Procedure: ESOPHAGOGASTRODUODENOSCOPY;  Surgeon: Jakob Hope MD;  Location: [facility] ENDOSCOPY;  Service: Gastroenterology;  Laterality: N/A;   • ESSURE TUBAL LIGATION  2010   • GASTRIC SLEEVE LAPAROSCOPIC  01/05/2022   • HYSTERECTOMY     • HYSTEROSCOPY ENDOMETRIAL ABLATION     • INCISION AND DRAINAGE ABSCESS N/A 09/19/2018    Procedure: INCISION AND DRAINAGE OF RIGHT BREAST AND LEFT FLANK  ABSCESSES;  Surgeon: Seaums Colón MD;  Location: [facility] OR;  Service: General   • INCISION AND DRAINAGE ABSCESS Right 11/05/2018    Procedure: INCISION AND DRAINAGE ABSCESS right breast;  Surgeon: Seamus Colón MD;  Location: [facility] OR;  Service: General   • INCISION AND DRAINAGE ABSCESS Right 12/21/2018    Procedure: Incision and Drainage of Right Breast Abscess;  Surgeon: Seamus Colón MD;   Location: [facility] OR;  Service: General     Family History   Problem Relation Age of Onset   • Breast cancer Mother      OB History             Para        Term   0            AB        Living           SAB        IAB        Ectopic        Molar        Multiple        Live Births                  Prior to Admission medications    Medication Sig Start Date End Date Taking? Authorizing Provider   adalimumab (Humira) 40 MG/0.8ML Prefilled Syringe Kit injection Inject 40 mg under the skin into the appropriate area as directed 1 (One) Time. Weekly   Yes Mariajose Novoa MD   cetirizine (zyrTEC) 10 MG tablet Take 1 tablet by mouth Daily. 23  Yes Mariajose Novoa MD   clindamycin (CLINDAGEL) 1 % gel Apply 2 application topically to the appropriate area as directed As Needed. 23 Yes Mariajose Novoa MD   famotidine (PEPCID) 20 MG tablet Take 1 tablet by mouth 2 (Two) Times a Day. 22  Yes Mariajose Novoa MD   nystatin (MYCOSTATIN) 579457 UNIT/GM cream Apply 1 application topically to the appropriate area as directed 2 (Two) Times a Day. 5/15/23  Yes Mariajose Novoa MD   carBAMazepine XR (TEGretol-XR) 200 MG 12 hr tablet Take 1 tablet by mouth 2 (Two) Times a Day for 14 days. 23  Mery Bernabe MD   fluticasone (FLONASE) 50 MCG/ACT nasal spray 2 sprays into the nostril(s) as directed by provider Daily.  Patient not taking: Reported on 2023   Mariajose Novoa MD   omeprazole (priLOSEC) 40 MG capsule Take 1 capsule by mouth Daily.  Patient not taking: Reported on 22   Jakob Hope MD     Allergies   Allergen Reactions   • Hydrocodone-Acetaminophen Nausea And Vomiting     Social History     Socioeconomic History   • Marital status: Single   Tobacco Use   • Smoking status: Former     Packs/day: 0.50     Years: 10.00     Pack years: 5.00     Types: Cigarettes     Quit date: 2021     Years since quittin.5  "  • Smokeless tobacco: Never   Vaping Use   • Vaping Use: Never used   Substance and Sexual Activity   • Alcohol use: No   • Drug use: No   • Sexual activity: Not Currently     Partners: Male     Birth control/protection: Essure       Review of Systems  Review of Systems   Constitutional: Negative for chills, fatigue, fever and unexpected weight change.   HENT: Negative for congestion, ear discharge, hearing loss, nosebleeds and sore throat.    Eyes: Negative for pain, discharge and redness.   Respiratory: Negative for cough, chest tightness, shortness of breath and wheezing.    Cardiovascular: Negative for chest pain and palpitations.   Gastrointestinal: Positive for abdominal pain. Negative for abdominal distention, blood in stool, constipation, diarrhea, nausea and vomiting.   Endocrine: Negative for cold intolerance, polydipsia, polyphagia and polyuria.   Genitourinary: Negative for dysuria, flank pain, frequency, hematuria and urgency.   Musculoskeletal: Negative for arthralgias, back pain, joint swelling and myalgias.   Skin: Negative for color change, pallor and rash.   Neurological: Negative for tremors, seizures, syncope, weakness and headaches.   Hematological: Negative for adenopathy. Does not bruise/bleed easily.   Psychiatric/Behavioral: Negative for behavioral problems, confusion, dysphoric mood, hallucinations and suicidal ideas. The patient is not nervous/anxious.         /77 (BP Location: Right arm)   Ht 172.7 cm (68\")   Wt 83 kg (183 lb)   BMI 27.83 kg/m²     Objective    Physical Exam  Constitutional:       Appearance: She is well-developed.   HENT:      Head: Normocephalic and atraumatic.   Eyes:      Conjunctiva/sclera: Conjunctivae normal.      Pupils: Pupils are equal, round, and reactive to light.   Neck:      Thyroid: No thyromegaly.   Cardiovascular:      Rate and Rhythm: Normal rate and regular rhythm.      Heart sounds: Normal heart sounds. No murmur heard.  Pulmonary:      " Effort: Pulmonary effort is normal.      Breath sounds: Normal breath sounds. No wheezing.   Abdominal:      General: Bowel sounds are normal. There is no distension.      Palpations: Abdomen is soft. There is no mass.      Tenderness: There is no abdominal tenderness.      Hernia: No hernia is present.   Genitourinary:     Comments: No lesions noted  Musculoskeletal:         General: No tenderness. Normal range of motion.      Cervical back: Normal range of motion and neck supple.   Lymphadenopathy:      Cervical: No cervical adenopathy.   Skin:     General: Skin is warm and dry.      Findings: No rash.   Neurological:      Mental Status: She is alert and oriented to person, place, and time.      Cranial Nerves: No cranial nerve deficit.   Psychiatric:         Thought Content: Thought content normal.       Admission on 05/10/2023, Discharged on 05/11/2023   Component Date Value Ref Range Status   • QT Interval 05/11/2023 490  ms Final   • QTC Interval 05/11/2023 437  ms Final   • Glucose 05/11/2023 85  65 - 99 mg/dL Final   • BUN 05/11/2023 16  6 - 20 mg/dL Final   • Creatinine 05/11/2023 0.60  0.57 - 1.00 mg/dL Final   • Sodium 05/11/2023 143  136 - 145 mmol/L Final   • Potassium 05/11/2023 3.5  3.5 - 5.2 mmol/L Final   • Chloride 05/11/2023 107  98 - 107 mmol/L Final   • CO2 05/11/2023 24.0  22.0 - 29.0 mmol/L Final   • Calcium 05/11/2023 9.2  8.6 - 10.5 mg/dL Final   • Total Protein 05/11/2023 7.2  6.0 - 8.5 g/dL Final   • Albumin 05/11/2023 4.0  3.5 - 5.2 g/dL Final   • ALT (SGPT) 05/11/2023 21  1 - 33 U/L Final   • AST (SGOT) 05/11/2023 21  1 - 32 U/L Final   • Alkaline Phosphatase 05/11/2023 47  39 - 117 U/L Final   • Total Bilirubin 05/11/2023 0.3  0.0 - 1.2 mg/dL Final   • Globulin 05/11/2023 3.2  gm/dL Final   • A/G Ratio 05/11/2023 1.3  g/dL Final   • BUN/Creatinine Ratio 05/11/2023 26.7 (H)  7.0 - 25.0 Final   • Anion Gap 05/11/2023 12.0  5.0 - 15.0 mmol/L Final   • eGFR 05/11/2023 112.3  >60.0  mL/min/1.73 Final   • WBC 05/11/2023 7.08  3.40 - 10.80 10*3/mm3 Final   • RBC 05/11/2023 4.64  3.77 - 5.28 10*6/mm3 Final   • Hemoglobin 05/11/2023 12.9  12.0 - 15.9 g/dL Final   • Hematocrit 05/11/2023 40.4  34.0 - 46.6 % Final   • MCV 05/11/2023 87.1  79.0 - 97.0 fL Final   • MCH 05/11/2023 27.8  26.6 - 33.0 pg Final   • MCHC 05/11/2023 31.9  31.5 - 35.7 g/dL Final   • RDW 05/11/2023 12.9  12.3 - 15.4 % Final   • RDW-SD 05/11/2023 40.6  37.0 - 54.0 fl Final   • MPV 05/11/2023 9.9  6.0 - 12.0 fL Final   • Platelets 05/11/2023 207  140 - 450 10*3/mm3 Final   • Neutrophil % 05/11/2023 36.4 (L)  42.7 - 76.0 % Final   • Lymphocyte % 05/11/2023 57.1 (H)  19.6 - 45.3 % Final   • Monocyte % 05/11/2023 5.4  5.0 - 12.0 % Final   • Eosinophil % 05/11/2023 0.7  0.3 - 6.2 % Final   • Basophil % 05/11/2023 0.1  0.0 - 1.5 % Final   • Immature Grans % 05/11/2023 0.3  0.0 - 0.5 % Final   • Neutrophils, Absolute 05/11/2023 2.58  1.70 - 7.00 10*3/mm3 Final   • Lymphocytes, Absolute 05/11/2023 4.04 (H)  0.70 - 3.10 10*3/mm3 Final   • Monocytes, Absolute 05/11/2023 0.38  0.10 - 0.90 10*3/mm3 Final   • Eosinophils, Absolute 05/11/2023 0.05  0.00 - 0.40 10*3/mm3 Final   • Basophils, Absolute 05/11/2023 0.01  0.00 - 0.20 10*3/mm3 Final   • Immature Grans, Absolute 05/11/2023 0.02  0.00 - 0.05 10*3/mm3 Final   • nRBC 05/11/2023 0.0  0.0 - 0.2 /100 WBC Final   • Extra Tube 05/11/2023 Hold for add-ons.   Final    Auto resulted.   • Extra Tube 05/11/2023 Hold for add-ons.   Final    Auto resulted     Assessment & Plan    No diagnosis found..   1.  Abdominal pain, well controlled.  Likely due to IBS.  Continue high-fiber diet and probiotics.  Continue Bentyl as needed.  2.  Constipation, well controlled.  Continue MiraLAX and high-fiber diet.  3.  Abdominal pain with nausea and weight loss, improving.  Likely due to reactive gastropathy.  Continue PPI and Pepcid.  4.  High BMI, recommend exercise and diet control.    Orders placed during  this encounter include:  No orders of the defined types were placed in this encounter.      * Surgery not found *    Review and/or summary of lab tests, radiology, procedures, medications. Review and summary of old records and obtaining of history. The risks and benefits of my recommendations, as well as other treatment options were discussed with the patient today. Questions were answered.    No orders of the defined types were placed in this encounter.      Follow-up: Return in about 6 months (around 12/1/2023).               Results for orders placed or performed during the hospital encounter of 05/10/23   [facility]   Result Value Ref Range    Extra Tube Hold for add-ons.    CBC Auto Differential    Specimen: Blood   Result Value Ref Range    WBC 7.08 3.40 - 10.80 10*3/mm3    RBC 4.64 3.77 - 5.28 10*6/mm3    Hemoglobin 12.9 12.0 - 15.9 g/dL    Hematocrit 40.4 34.0 - 46.6 %    MCV 87.1 79.0 - 97.0 fL    MCH 27.8 26.6 - 33.0 pg    MCHC 31.9 31.5 - 35.7 g/dL    RDW 12.9 12.3 - 15.4 %    RDW-SD 40.6 37.0 - 54.0 fl    MPV 9.9 6.0 - 12.0 fL    Platelets 207 140 - 450 10*3/mm3    Neutrophil % 36.4 (L) 42.7 - 76.0 %    Lymphocyte % 57.1 (H) 19.6 - 45.3 %    Monocyte % 5.4 5.0 - 12.0 %    Eosinophil % 0.7 0.3 - 6.2 %    Basophil % 0.1 0.0 - 1.5 %    Immature Grans % 0.3 0.0 - 0.5 %    Neutrophils, Absolute 2.58 1.70 - 7.00 10*3/mm3    Lymphocytes, Absolute 4.04 (H) 0.70 - 3.10 10*3/mm3    Monocytes, Absolute 0.38 0.10 - 0.90 10*3/mm3    Eosinophils, Absolute 0.05 0.00 - 0.40 10*3/mm3    Basophils, Absolute 0.01 0.00 - 0.20 10*3/mm3    Immature Grans, Absolute 0.02 0.00 - 0.05 10*3/mm3    nRBC 0.0 0.0 - 0.2 /100 WBC   Light Blue Top   Result Value Ref Range    Extra Tube Hold for add-ons.    Comprehensive Metabolic Panel    Specimen: Blood   Result Value Ref Range    Glucose 85 65 - 99 mg/dL    BUN 16 6 - 20 mg/dL    Creatinine 0.60 0.57 - 1.00 mg/dL    Sodium 143 136 - 145 mmol/L    Potassium 3.5 3.5 - 5.2 mmol/L     Chloride 107 98 - 107 mmol/L    CO2 24.0 22.0 - 29.0 mmol/L    Calcium 9.2 8.6 - 10.5 mg/dL    Total Protein 7.2 6.0 - 8.5 g/dL    Albumin 4.0 3.5 - 5.2 g/dL    ALT (SGPT) 21 1 - 33 U/L    AST (SGOT) 21 1 - 32 U/L    Alkaline Phosphatase 47 39 - 117 U/L    Total Bilirubin 0.3 0.0 - 1.2 mg/dL    Globulin 3.2 gm/dL    A/G Ratio 1.3 g/dL    BUN/Creatinine Ratio 26.7 (H) 7.0 - 25.0    Anion Gap 12.0 5.0 - 15.0 mmol/L    eGFR 112.3 >60.0 mL/min/1.73   ECG 12 Lead Bradycardia   Result Value Ref Range    QT Interval 490 ms    QTC Interval 437 ms   Results for orders placed or performed during the hospital encounter of 23   TISSUE EXAM, [facility] ([facility]ET,[facility])    Specimen: A: Small Intestine, Duodenum; Tissue    B: Gastric, Antrum; Tissue    C: Esophagus; Tissue    D: Small Intestine, Ileum; Tissue    E: Large Intestine; Tissue   Result Value Ref Range    Reference Lab Report       [facility]  [address] [place] [address]  Phone: [phone] or [phone]  Fax: [phone]  Qamar Roberts M.D., Medical Director    PATIENT NAME                           LABORATORY NO.  [id number]  LIBERTY MANZO.                  [id number]  [id number]                         AGE              SEX  N           CLIENT REF #  [facility]           47      1975  F    [id number]   [id number]    [place]                       REQUESTING M.D.     ATTENDING M.D.     COPY TO.  [address] [place] [address]                 JAIME LUCIO EMMA  [address]  DATE COLLECTED      DATE RECEIVED      DATE REPORTED  2023    DIAGNOSIS:  A.   DUODENUM, BIOPSY:  Benign duodenum, negative for significant architectural distortion,  inflammatory infiltrate, neoplasia, malignancy    B.   GASTRIC ANTRUM, BIOPSY:  Reactive (chemical)  gastropathy  Negative for intestinal metaplasia, neoplasia, malignancy  No  "Helicobacter-like organisms identified on H and E    C.   GE JUNCTION:  Fragments of tissue consistent with gastroesophageal junction  Negative for significant architectural distortion, inflammatory infiltrate,  neoplasia, malignancy  Negative for significant eosinophilia (less than 1 per HPF)  Negative for intestinal metaplasia, dysplasia    D.   TERMINAL ILEUM BIOPSY:  Benign small bowel, consistent with terminal ileum  Negative for significant architectural distortion, inflammatory infiltrate,  neoplasia, malignancy    E.   COLON, BIOPSY:  Benign colonic mucosa, negative for significant architectural distortion,  inflammatory infiltrate, neoplasia, malignancy    CLINICAL HISTORY:  Generalized abdominal pain, nausea, diarrhea, unspecified type    SPECIMENS RECEIVED:  A.  DUODENUM, BIOPSY  B.  GASTRIC ANTRUM, BIOPSY  C.  GE JUNCTION  D.  TERMINAL ILEUM BIOPSY  E.  COLON, BIOPSY    MICROSCOPIC  DESCRIPTION:  Tissue blocks are prepared and slides are examined microscopically on all  specimens. See diagnosis for details.    Professional interpretation rendered by Dee Alcaraz M.D., F.C.A.P. at  [employer] [place], [address] [place] [address].    GROSS DESCRIPTION:  A.  Specimen is received in 1 formalin filled container labeled \"duodenum\"  and consists of 2 portions of tan soft tissue measuring 0.7 x 0.5 x 0.2 cm in  aggregate.  Submitted entirely in 1 cassette.  MTH  B.  Specimen is received in 1 formalin filled container labeled \"gastric,  antrum\" and consists of 2 portions of tan soft tissue measuring 0.6 x 0.6 x  0.2 cm in aggregate.  Submitted entirely in 1 cassette.  C.  Specimen is received in 1 formalin filled container labeled \"EGJ\" and  consists of multiple portions of gray soft tissue measuring 0.8 x 0.5 x 0.1  cm in aggregate.  Submitted entirely in 1 cassette.  D.  Specimen is received in 1 formalin filled container labeled \"TI\" and  consists of 2  portions of tan soft tissue measuring 0.4 x " "0.3 x 0.2 cm in  aggregate.  Submitted entirely in 1 cassette.  E.  Specimen is received in 1 formalin filled container labeled \"colonic  mucosa\" and consists of 3 portions of tan soft tissue measuring 0.5 x 0.4 x  0.2 cm in aggregate.  Submitted entirely in 1 cassette.    REVIEWED, DIAGNOSED AND ELECTRONICALLY  SIGNED BY:    Dee Alcaraz M.D., F.C.A.P.  CPT CODES:  88305x5     Results for orders placed or performed during the hospital encounter of 02/26/22   Green Top (Gel)   Result Value Ref Range    Extra Tube Hold for add-ons.    CBC Auto Differential    Specimen: Blood   Result Value Ref Range    WBC 5.58 3.40 - 10.80 10*3/mm3    RBC 5.32 (H) 3.77 - 5.28 10*6/mm3    Hemoglobin 14.8 12.0 - 15.9 g/dL    Hematocrit 45.6 34.0 - 46.6 %    MCV 85.7 79.0 - 97.0 fL    MCH 27.8 26.6 - 33.0 pg    MCHC 32.5 31.5 - 35.7 g/dL    RDW 13.9 12.3 - 15.4 %    RDW-SD 43.5 37.0 - 54.0 fl    MPV 11.0 6.0 - 12.0 fL    Platelets 202 140 - 450 10*3/mm3    Neutrophil % 51.2 42.7 - 76.0 %    Lymphocyte % 39.6 19.6 - 45.3 %    Monocyte % 6.6 5.0 - 12.0 %    Eosinophil % 2.0 0.3 - 6.2 %    Basophil % 0.4 0.0 - 1.5 %    Immature Grans % 0.2 0.0 - 0.5 %    Neutrophils, Absolute 2.86 1.70 - 7.00 10*3/mm3    Lymphocytes, Absolute 2.21 0.70 - 3.10 10*3/mm3    Monocytes, Absolute 0.37 0.10 - 0.90 10*3/mm3    Eosinophils, Absolute 0.11 0.00 - 0.40 10*3/mm3    Basophils, Absolute 0.02 0.00 - 0.20 10*3/mm3    Immature Grans, Absolute 0.01 0.00 - 0.05 10*3/mm3    nRBC 0.0 0.0 - 0.2 /100 WBC   Troponin    Specimen: Blood   Result Value Ref Range    Troponin T <0.010 0.000 - 0.030 ng/mL   hCG, Serum, Qualitative    Specimen: Blood   Result Value Ref Range    HCG Qualitative Negative Negative   BNP    Specimen: Blood   Result Value Ref Range    proBNP 26.3 0.0 - 450.0 pg/mL   Magnesium    Specimen: Blood   Result Value Ref Range    Magnesium 2.0 1.6 - 2.6 mg/dL   Lipase    Specimen: Blood   Result Value Ref Range    Lipase 14 13 - 60 U/L "   Comprehensive Metabolic Panel    Specimen: Blood   Result Value Ref Range    Glucose 113 (H) 65 - 99 mg/dL    BUN 9 6 - 20 mg/dL    Creatinine 0.89 0.57 - 1.00 mg/dL    Sodium 141 136 - 145 mmol/L    Potassium 3.0 (L) 3.5 - 5.2 mmol/L    Chloride 104 98 - 107 mmol/L    CO2 25.0 22.0 - 29.0 mmol/L    Calcium 9.4 8.6 - 10.5 mg/dL    Total Protein 7.5 6.0 - 8.5 g/dL    Albumin 4.10 3.50 - 5.20 g/dL    ALT (SGPT) 44 (H) 1 - 33 U/L    AST (SGOT) 39 (H) 1 - 32 U/L    Alkaline Phosphatase 60 39 - 117 U/L    Total Bilirubin 0.8 0.0 - 1.2 mg/dL    eGFR  African Amer 83 >60 mL/min/1.73    Globulin 3.4 gm/dL    A/G Ratio 1.2 g/dL    BUN/Creatinine Ratio 10.1 7.0 - 25.0    Anion Gap 12.0 5.0 - 15.0 mmol/L   ECG 12 Lead   Result Value Ref Range    QT Interval 444 ms    QTC Interval 458 ms   Results for orders placed or performed in visit on 10/12/21   Treadmill Stress Test   Result Value Ref Range    Target HR (85%) 149 bpm    Max. Pred. HR (100%) 175 bpm     *Note: Due to a large number of results and/or encounters for the requested time period, some results have not been displayed. A complete set of results can be found in Results Review.         This document has been electronically signed by Jakob Hope MD on June 19, 2023 07:35 CDT

## 2023-09-13 ENCOUNTER — HOSPITAL ENCOUNTER (EMERGENCY)
Facility: HOSPITAL | Age: 48
Discharge: HOME | End: 2023-09-13
Attending: STUDENT IN AN ORGANIZED HEALTH CARE EDUCATION/TRAINING PROGRAM
Payer: COMMERCIAL

## 2023-09-13 VITALS
DIASTOLIC BLOOD PRESSURE: 79 MMHG | HEIGHT: 68 IN | TEMPERATURE: 98.4 F | OXYGEN SATURATION: 98 % | BODY MASS INDEX: 26.98 KG/M2 | HEART RATE: 58 BPM | WEIGHT: 178 LBS | SYSTOLIC BLOOD PRESSURE: 170 MMHG | RESPIRATION RATE: 18 BRPM

## 2023-09-13 DIAGNOSIS — L03.213 PERIORBITAL CELLULITIS OF LEFT EYE: Primary | ICD-10-CM

## 2023-09-13 PROCEDURE — 99283 EMERGENCY DEPT VISIT LOW MDM: CPT

## 2023-09-13 RX ORDER — DOXYCYCLINE 100 MG/1
100 CAPSULE ORAL 2 TIMES DAILY
Qty: 14 CAPSULE | Refills: 0 | Status: SHIPPED | OUTPATIENT
Start: 2023-09-13 | End: 2023-09-20

## 2023-09-13 RX ORDER — DOXYCYCLINE 100 MG/1
100 CAPSULE ORAL ONCE
Status: COMPLETED | OUTPATIENT
Start: 2023-09-13 | End: 2023-09-13

## 2023-09-13 RX ADMIN — DOXYCYCLINE 100 MG: 100 CAPSULE ORAL at 01:41

## 2023-09-13 NOTE — Clinical Note
[facility] EMERGENCY DEPARTMENT  [address] [place] [id number]  Phone: [phone]    Elijah Crouch was seen and treated in our emergency department on 9/13/2023.  She may return to work on 09/15/2023.         Thank you for choosing [facility].    Samuel Roque MD

## 2023-09-13 NOTE — ED PROVIDER NOTES
Subjective   History of Present Illness  48-year-old female comes to the ER chief complaint of swelling of her left eyelid.  It is sore and painful.  Eye is not red, painful.  Patient states it is a little bit watery.  Patient states it popped up a couple days ago and she was prescribed antibiotic eyedrops 2 days ago.  The swelling has gotten worse.  She denies other symptoms.    History provided by:  Patient   used: No      Review of Systems   Constitutional:  Negative for chills and fever.   HENT:  Negative for drooling.    Eyes:  Negative for photophobia, pain, discharge, redness, itching and visual disturbance.   Respiratory:  Negative for shortness of breath.    Skin:  Positive for rash. Negative for color change.     Past Medical History:   Diagnosis Date    GERD (gastroesophageal reflux disease) 945567    Migraines     Wears glasses        Allergies   Allergen Reactions    Hydrocodone-Acetaminophen Nausea And Vomiting       Past Surgical History:   Procedure Laterality Date    ABDOMINAL SURGERY  [id number]    Sleeve    BARIATRIC SURGERY  [id number]    BREAST LUMPECTOMY Right 01/23/2019    Procedure: Excision of chronically infected right breast tissue;  Surgeon: Seamus Colón MD;  Location: [facility] OR;  Service: General    CHOLECYSTECTOMY      COLONOSCOPY N/A 1/11/2023    Procedure: COLONOSCOPY;  Surgeon: Jakob Hope MD;  Location: [facility] ENDOSCOPY;  Service: Gastroenterology;  Laterality: N/A;    ENDOSCOPY N/A 1/11/2023    Procedure: ESOPHAGOGASTRODUODENOSCOPY;  Surgeon: Jakob Hope MD;  Location: [facility] ENDOSCOPY;  Service: Gastroenterology;  Laterality: N/A;    ESSURE TUBAL LIGATION  2010    GASTRIC SLEEVE LAPAROSCOPIC  01/05/2022    HYSTERECTOMY      HYSTEROSCOPY ENDOMETRIAL ABLATION      INCISION AND DRAINAGE ABSCESS N/A 09/19/2018    Procedure: INCISION AND DRAINAGE OF RIGHT BREAST AND LEFT FLANK  ABSCESSES;  Surgeon: Seamus Colón MD;  Location: [facility]  "OR;  Service: General    INCISION AND DRAINAGE ABSCESS Right 2018    Procedure: INCISION AND DRAINAGE ABSCESS right breast;  Surgeon: Seamus Colón MD;  Location: [facility] OR;  Service: General    INCISION AND DRAINAGE ABSCESS Right 2018    Procedure: Incision and Drainage of Right Breast Abscess;  Surgeon: Seamus Colón MD;  Location: [facility] OR;  Service: General       Family History   Problem Relation Age of Onset    Breast cancer Mother        Social History     Socioeconomic History    Marital status: Single   Tobacco Use    Smoking status: Former     Packs/day: 0.50     Years: 10.00     Pack years: 5.00     Types: Cigarettes     Quit date: 2021     Years since quittin.7    Smokeless tobacco: Never   Vaping Use    Vaping Use: Never used   Substance and Sexual Activity    Alcohol use: No    Drug use: No    Sexual activity: Not Currently     Partners: Male     Birth control/protection: Essure           Objective   Vitals:    23 0115   BP: 170/79   BP Location: Left arm   Patient Position: Sitting   Pulse: 58   Resp: 18   Temp: 98.4 °F (36.9 °C)   TempSrc: Oral   SpO2: 98%   Weight: 80.7 kg (178 lb)   Height: 172.7 cm (68\")       Physical Exam  Vitals and nursing note reviewed.   Constitutional:       General: She is not in acute distress.     Appearance: She is well-developed. She is not ill-appearing or diaphoretic.   HENT:      Head: Normocephalic.   Eyes:      General:         Right eye: No discharge.         Left eye: No discharge.      Conjunctiva/sclera: Conjunctivae normal.      Right eye: Right conjunctiva is not injected. No chemosis or exudate.     Left eye: Left conjunctiva is not injected. No chemosis or exudate.    Pulmonary:      Effort: Pulmonary effort is normal. No accessory muscle usage or respiratory distress.   Skin:     General: Skin is warm and dry.       Procedures           ED Course                                           Medical Decision " Making  Vital signs are stable, afebrile.  Patient has full range of motion of her eye without pain.  Clinically appears she has periorbital cellulitis.  She received a dose of antibiotics in the ER.  Antibiotics called into her pharmacy.  Return precautions given.  Recommend follow-up with her PCP.  Patient states understanding and is agreeable to the plan.        Final diagnoses:   Periorbital cellulitis of left eye       ED Disposition  ED Disposition       ED Disposition   Discharge    Condition   Stable    Comment   --               Doretha Howard, APRN  [address] [facility] [address] [phone]    Schedule an appointment as soon as possible for a visit in 2 days  As needed, ER follow up         Medication List        New Prescriptions      doxycycline 100 MG capsule  Commonly known as: MONODOX  Take 1 capsule by mouth 2 (Two) Times a Day for 7 days.               Where to Get Your Medications        These medications were sent to [facility] - [place], [place] - [address] [facility] [facility] - [phone]  - [phone]   [address] [facility] [address]      Phone: [phone]   doxycycline 100 MG capsule            Samuel Roque MD  09/13/23 0132

## (undated) DEVICE — GLV SURG TRIUMPH LT PF LTX 7.5 STRL

## (undated) DEVICE — STERILE POLYISOPRENE POWDER-FREE SURGICAL GLOVES WITH EMOLLIENT COATING: Brand: PROTEXIS

## (undated) DEVICE — GLV SURG SENSICARE PI PF LF 7 GRN STRL

## (undated) DEVICE — GLV SURG SENSICARE PI LF PF 8 GRN STRL

## (undated) DEVICE — PREP SOL POVIDONE/IODINE BT 4OZ

## (undated) DEVICE — PREP PVP-I 7.5P BT 4OZ

## (undated) DEVICE — SOL IRR NACL 0.9PCT BT 1000ML

## (undated) DEVICE — GLV SURG TRIUMPH ORTHO W/ALOE PF LTX 7.0

## (undated) DEVICE — TRAP,MUCUS SPECIMEN,40CC: Brand: MEDLINE

## (undated) DEVICE — GLV SURG TRIUMPH NATURAL W/ALOE PF LTX 7 STRL

## (undated) DEVICE — BITEBLOCK ENDO W/STRAP 60F A/ LF DISP

## (undated) DEVICE — GOWN,AURORA,NOREINF,RAGLAN,XL,STERILE: Brand: MEDLINE

## (undated) DEVICE — TOWEL,OR,DSP,ST,BLUE,DLX,4/PK,20PK/CS: Brand: MEDLINE

## (undated) DEVICE — GLV SURG NEOLON 2G PF LF 6.5 STRL

## (undated) DEVICE — GLV SURG SENSICARE GREEN W/ALOE PF LF 8 STRL

## (undated) DEVICE — GLV SURG TRIUMPH LT PF LTX 6.5 STRL

## (undated) DEVICE — INTENDED FOR TISSUE SEPARATION, AND OTHER PROCEDURES THAT REQUIRE A SHARP SURGICAL BLADE TO PUNCTURE OR CUT.: Brand: BARD-PARKER ® CARBON RIB-BACK BLADES

## (undated) DEVICE — 9165 UNIVERSAL PATIENT PLATE: Brand: 3M™

## (undated) DEVICE — GLV SURG SENSICARE POLYISPRN W/ALOE PF LF 6.5 GRN STRL

## (undated) DEVICE — SPNG GZ WOVN 4X4IN 12PLY 10/BX STRL

## (undated) DEVICE — UNDRPD BREATH 23X36 BG/10

## (undated) DEVICE — PK MAJ PROC LF 60

## (undated) DEVICE — GLV SURG SENSICARE PI LF PF 7.5 GRN STRL

## (undated) DEVICE — SINGLE-USE BIOPSY FORCEPS: Brand: RADIAL JAW 4

## (undated) DEVICE — MSK ENDO PORT O2 POM ELITE CURAPLEX A/

## (undated) DEVICE — STERILE POLYISOPRENE POWDER-FREE SURGICAL GLOVES: Brand: PROTEXIS

## (undated) DEVICE — GLV SURG SENSICARE GREEN W/ALOE PF LF 7 STRL